# Patient Record
Sex: MALE | Race: WHITE | Employment: FULL TIME | URBAN - METROPOLITAN AREA
[De-identification: names, ages, dates, MRNs, and addresses within clinical notes are randomized per-mention and may not be internally consistent; named-entity substitution may affect disease eponyms.]

---

## 2017-01-02 DIAGNOSIS — I10 HYPERTENSION GOAL BP (BLOOD PRESSURE) < 140/90: Primary | ICD-10-CM

## 2017-01-02 DIAGNOSIS — M10.9 GOUT: Primary | ICD-10-CM

## 2017-01-02 RX ORDER — ALLOPURINOL 300 MG/1
300 TABLET ORAL DAILY
Qty: 30 TABLET | Refills: 0 | Status: CANCELLED | OUTPATIENT
Start: 2017-01-02

## 2017-01-02 RX ORDER — LISINOPRIL 20 MG/1
20 TABLET ORAL DAILY
Qty: 90 TABLET | Refills: 3 | Status: CANCELLED | OUTPATIENT
Start: 2017-01-02

## 2017-01-04 NOTE — TELEPHONE ENCOUNTER
His last appointment(s) at [facility] was 5-1-14. He should be seen for a hypertension visit. He should get the medication from his current provider until he is seen at [facility].

## 2017-01-04 NOTE — TELEPHONE ENCOUNTER
Alvaro Pires MD at 1/4/2017  1:26 PM      Status: Signed         Expand All Collapse All    His last appointment(s) at [facility] was 5-1-14. He should be seen for a hypertension visit. He should get the medication from his current provider until he is seen at [facility].

## 2017-01-27 ENCOUNTER — OFFICE VISIT (OUTPATIENT)
Dept: FAMILY MEDICINE | Facility: CLINIC | Age: 47
End: 2017-01-27
Payer: COMMERCIAL

## 2017-01-27 VITALS
BODY MASS INDEX: 35.16 KG/M2 | WEIGHT: 274 LBS | HEART RATE: 64 BPM | RESPIRATION RATE: 15 BRPM | TEMPERATURE: 98.9 F | SYSTOLIC BLOOD PRESSURE: 148 MMHG | DIASTOLIC BLOOD PRESSURE: 86 MMHG | OXYGEN SATURATION: 99 %

## 2017-01-27 DIAGNOSIS — M10.9 GOUT, UNSPECIFIED CAUSE, UNSPECIFIED CHRONICITY, UNSPECIFIED SITE: ICD-10-CM

## 2017-01-27 DIAGNOSIS — N52.9 ERECTILE DYSFUNCTION, UNSPECIFIED ERECTILE DYSFUNCTION TYPE: ICD-10-CM

## 2017-01-27 DIAGNOSIS — I10 HYPERTENSION GOAL BP (BLOOD PRESSURE) < 140/90: Primary | ICD-10-CM

## 2017-01-27 PROCEDURE — 99213 OFFICE O/P EST LOW 20 MIN: CPT | Performed by: FAMILY MEDICINE

## 2017-01-27 RX ORDER — COLCHICINE 0.6 MG/1
TABLET ORAL
Qty: 30 TABLET | Refills: 0 | Status: SHIPPED
Start: 2017-01-27 | End: 2017-03-30

## 2017-01-27 RX ORDER — LISINOPRIL 20 MG/1
20 TABLET ORAL DAILY
Qty: 90 TABLET | Refills: 3 | Status: SHIPPED | OUTPATIENT
Start: 2017-01-27 | End: 2022-01-04 | Stop reason: ALTCHOICE

## 2017-01-27 RX ORDER — SILDENAFIL 100 MG/1
50-100 TABLET, FILM COATED ORAL DAILY PRN
Qty: 6 TABLET | Refills: 3 | Status: SHIPPED | OUTPATIENT
Start: 2017-01-27

## 2017-01-27 RX ORDER — ALLOPURINOL 300 MG/1
300 TABLET ORAL DAILY
Qty: 90 TABLET | Refills: 3 | Status: SHIPPED | OUTPATIENT
Start: 2017-01-27 | End: 2018-04-14

## 2017-01-27 ASSESSMENT — PAIN SCALES - GENERAL: PAINLEVEL: NO PAIN (0)

## 2017-01-27 NOTE — PATIENT INSTRUCTIONS
Gout Diet  Gout is a painful condition caused by an excess of uric acid, a waste product made by the body. Uric acid forms crystals that collect in the joints. This brings on symptoms of joint pain and swelling. This is called a gout attack. Often, medications and diet changes are combined to manage gout. Below are some guidelines for changing your diet to help you manage gout and prevent attacks. Your health care provider will help you determine the best eating plan for you.     Limiting or avoiding certain foods can help prevent gout attacks.   Eating to manage gout  Weight loss for those who are overweight may help reduce gout attacks.  Eat less of these foods  Eating too many foods containing purines may raise the levels of uric acid in your body. This raises your risk for a gout attack. Try to limit these foods and drinks:    Alcohol, such as beer and red wine. You may be told to avoid alcohol completely.    Soft drinks that contain sugar or high fructose corn syrup    Certain fish, including anchovies, sardines, fish eggs, and herring    Certain meats, such as red meat, hot dogs, luncheon meats, and turkey    Organ meats, such as liver, kidneys, and sweetbreads    Legumes, such as dried beans and peas    Mushrooms, spinach, asparagus, and cauliflower    Other high fat foods such as gravy, whole milk, and high fat cheeses  Eat more of these foods  Other foods may be helpful for people with gout. Add some of these foods to your diet:    Dark berries, such as blueberries, blackberries, and cherries. These contain chemicals that may lower uric acid.    Tofu, a source of protein made from soy. Studies have shown that it may be a better choice than meat for people with gout.    Omega fatty acids. These are found in some fatty fish such as salmon, certain oils (flax, olive, or nut), and nuts themselves. Omega fatty acids may help prevent inflammation due to gout.    Dairy products that are low-fat or fat-free, such  as cheese and yogurt    Complex carbohydrate foods, including whole grains, brown rice, oats, and beans    Coffee, in moderation  Follow-up care  Follow up with your health care provider, or as advised.  When to seek medical advice  Call your health care provider right away if any of these occur:    Return of gout symptoms, usually at night:    Severe pain, swelling, and heat in a joint, especially the base of the big toe    Affected joint is hard to move    Skin of the affected joint is purple or red    Fever of 100.4 F (38 C) or higher    Pain that doesn't get better even with prescribed medicine     2170-0070 [employer]. [address] [place] [address]. All rights reserved. This information is not intended as a substitute for professional medical care. Always follow your healthcare professional's instructions.        Eating to Prevent Gout  Gout is a painful form of arthritis caused by an excess of uric acid. This is a waste product made by the body. It builds up in the body and forms crystals that collect in the joints, bringing on a gout attack. Alcohol and certain foods can trigger a gout attack. Below are some guidelines for changing your diet to help you manage gout. Your healthcare provider can work with you to determine the best eating plan for you. Know that diet is only one part of managing gout. Take your medicines as prescribed and follow the other guidelines your healthcare provider has given you.  Foods to limit  Eating too many foods containing purines may increase the levels of uric acid in your body and increase your risk for a gout attack. It may be best to limit these high-purine foods:    Alcohol (beer, red wine). You may be told to avoid alcohol completely.    Certain fish (anchovies, sardines, fish roes, herring, tuna, mussels, codfish, scallops, trout, and jamarcus)    Certain meats (red meat, processed meat, almendarez, turkey, wild game, and goose)    Sauces and gravies  made with meat    Organ meats (such as liver, kidneys, sweetbreads, and tripe)    Legumes (such as dried beans, peas)    Mushrooms, spinach, asparagus, and cauliflower    Yeast and yeast extract supplements  Foods to try  Some foods may be helpful for people with gout. You may want to try adding some of the following foods to your diet:    Dark berries: These include blueberries, blackberries, and cherries. These berries contain chemicals that may lower uric acid.    Tofu: Tofu, which is made from soy, is a good source of protein. Studies have shown that it may be a better choice than meat for people with gout.    Omega fatty acids: These acids are found in fatty fish (such as salmon), certain oils (such as flax, olive, or nut oils), or nuts. They may help prevent inflammation due to gout.  The following guidelines are recommended by the American Medical Association for people with gout. Your diet should be:    High in fiber, whole grains, fruits, and vegetables.    Low in protein (15% of calories should come from protein. Choose lean sources such as soy, lean meats, and poultry).    Low in fat (no more than 30% of calories should come from fat, with only 10% coming from animal fat).     1752-0890 [employer]. [address] [place] [address], [place] [address]. All rights reserved. This information is not intended as a substitute for professional medical care. Always follow your healthcare professional's instructions.

## 2017-01-27 NOTE — PROGRESS NOTES
SUBJECTIVE:                                                    Rafiq Domínguez is a 46 year old male who presents to clinic today for the following health issues:      Patient needs a refill on Allopurinol and Lisinopril      Amount of exercise or physical activity: works for [employer] , very active    Problems taking medications regularly: Yes,  Ran out    Medication side effects: none    Diet: regular (no restrictions)    --------------------------------------------------------------------------------------------------------------------------------------      SUBJECTIVE:  Rafiq Domínguez is an 46 year old male who presents for a follow up evaluation of his hypertension.The patient was kept on the same medications at the last visit. The patient reports that he IS NOT taking the medication as prescribed as he ran out of it. . He denies side effects of medication.  He last took it 2 weeks ago because he ran out of the medication.    He takes allopurinol daily but he also ran out of it about 2 weeks.   He reports that he is having some toe pain since stopping the allopurinol .        Patient Active Problem List   Diagnosis     Obesity     CARDIOVASCULAR SCREENING; LDL GOAL LESS THAN 160     Hypertension goal BP (blood pressure) < 140/90     Gout, unspecified cause, unspecified chronicity, unspecified site     Erectile dysfunction, unspecified erectile dysfunction type       Is the HYPERTENSION goal on the problem list? Yes    Current Outpatient Prescriptions   Medication     sildenafil (VIAGRA) 100 MG cap/tab     lisinopril (PRINIVIL/ZESTRIL) 20 MG tablet     allopurinol (ZYLOPRIM) 300 MG tablet     colchicine (COLCRYS) 0.6 MG tablet     [DISCONTINUED] lisinopril (PRINIVIL,ZESTRIL) 20 MG tablet     [DISCONTINUED] sildenafil (VIAGRA) 100 MG tablet     No current facility-administered medications for this visit.     Use of agents associated with hypertension: none    Allergies   Allergen Reactions     Nkda [No Known Drug  Allergies]        Social History   Substance Use Topics     Smoking status: Never Smoker      Smokeless tobacco: Not on file     Alcohol Use: Yes      Comment: 2 beers daily       OBJECTIVE:  /86 mmHg  Pulse 64  Temp(Src) 98.9  F (37.2  C) (Tympanic)  Resp 15  Wt 274 lb (124.286 kg)  SpO2 99%    Heart: negative, PMI normal. No lifts, heaves, or thrills. RRR. No murmurs, clicks gallops or rub  Lower Extremities: 0 edema on right and 0 edema on the left        Results for orders placed or performed in visit on 05/01/14   Basic metabolic panel   Result Value Ref Range    Sodium 139 133 - 144 mmol/L    Potassium 4.3 3.4 - 5.3 mmol/L    Chloride 104 94 - 109 mmol/L    Carbon Dioxide 24 20 - 32 mmol/L    Anion Gap 11 6 - 17 mmol/L    Glucose 100 (H) 60 - 99 mg/dL    Urea Nitrogen 15 5 - 24 mg/dL    Creatinine 0.98 0.66 - 1.25 mg/dL    GFR Estimate 84 >60 mL/min/1.7m2    GFR Estimate If Black >90 >60 mL/min/1.7m2    Calcium 9.1 8.5 - 10.4 mg/dL   Uric acid   Result Value Ref Range    Uric Acid 6.7 3.5 - 8.5 mg/dL       The ASCVD Risk score (Jose DC Jr., et al., 2013) failed to calculate for the following reasons:    Cannot find a previous HDL lab    Cannot find a previous total cholesterol lab    ASSESSMENT:  Essential hypertension which is poorly controlled.       Plan:  - Medication: restart  the current doses of medication.    The patient was advised to do the following therapuetic life style changes  - Dietary sodium restriction and increase potassium and Calcium intake  - Regular aerobic exercise  - Weight loss  - Discontinue smoking if applicable  - Avoid regular NSAID use if applicable  - Avoid regular decongestant use if applicable  - Follow up in clinic in 3 weeks for a recheck  - Check a basic metabolic panel today    Patient Education: Reviewed risks of hypertension and principles of   Treatment.    (I10) Hypertension goal BP (blood pressure) < 140/90  (primary encounter diagnosis)  Comment:   Plan:  lisinopril (PRINIVIL/ZESTRIL) 20 MG tablet            (M10.9) Gout, unspecified cause, unspecified chronicity, unspecified site  Comment:   Plan: allopurinol (ZYLOPRIM) 300 MG tablet,         colchicine (COLCRYS) 0.6 MG tablet            (N52.9) Erectile dysfunction, unspecified erectile dysfunction type  Comment:   Plan: sildenafil (VIAGRA) 100 MG cap/tab

## 2017-01-27 NOTE — NURSING NOTE
"Chief Complaint   Patient presents with     Recheck Medication     out of medication x 2 weeks       Initial /87 mmHg  Pulse 64  Temp(Src) 98.9  F (37.2  C) (Tympanic)  Resp 15  Wt 274 lb (124.286 kg)  SpO2 99% Estimated body mass index is 35.16 kg/(m^2) as calculated from the following:    Height as of 5/1/14: 6' 2\" (1.88 m).    Weight as of this encounter: 274 lb (124.286 kg).  BP completed using cuff size: shelbi Gavin MA      "

## 2017-01-27 NOTE — MR AVS SNAPSHOT
After Visit Summary   1/27/2017    Rafiq Domínguez    MRN: [id number]           Patient Information     Date Of Birth          1970        Visit Information        Provider Department      1/27/2017 10:45 AM Alvaro Pires MD [facility]        Today's Diagnoses     Hypertension goal BP (blood pressure) < 140/90    -  1     Gout, unspecified cause, unspecified chronicity, unspecified site         Erectile dysfunction, unspecified erectile dysfunction type           Care Instructions      Gout Diet  Gout is a painful condition caused by an excess of uric acid, a waste product made by the body. Uric acid forms crystals that collect in the joints. This brings on symptoms of joint pain and swelling. This is called a gout attack. Often, medications and diet changes are combined to manage gout. Below are some guidelines for changing your diet to help you manage gout and prevent attacks. Your health care provider will help you determine the best eating plan for you.     Limiting or avoiding certain foods can help prevent gout attacks.   Eating to manage gout  Weight loss for those who are overweight may help reduce gout attacks.  Eat less of these foods  Eating too many foods containing purines may raise the levels of uric acid in your body. This raises your risk for a gout attack. Try to limit these foods and drinks:    Alcohol, such as beer and red wine. You may be told to avoid alcohol completely.    Soft drinks that contain sugar or high fructose corn syrup    Certain fish, including anchovies, sardines, fish eggs, and herring    Certain meats, such as red meat, hot dogs, luncheon meats, and turkey    Organ meats, such as liver, kidneys, and sweetbreads    Legumes, such as dried beans and peas    Mushrooms, spinach, asparagus, and cauliflower    Other high fat foods such as gravy, whole milk, and high fat cheeses  Eat more of these foods  Other foods may be helpful for people with  gout. Add some of these foods to your diet:    Dark berries, such as blueberries, blackberries, and cherries. These contain chemicals that may lower uric acid.    Tofu, a source of protein made from soy. Studies have shown that it may be a better choice than meat for people with gout.    Omega fatty acids. These are found in some fatty fish such as salmon, certain oils (flax, olive, or nut), and nuts themselves. Omega fatty acids may help prevent inflammation due to gout.    Dairy products that are low-fat or fat-free, such as cheese and yogurt    Complex carbohydrate foods, including whole grains, brown rice, oats, and beans    Coffee, in moderation  Follow-up care  Follow up with your health care provider, or as advised.  When to seek medical advice  Call your health care provider right away if any of these occur:    Return of gout symptoms, usually at night:    Severe pain, swelling, and heat in a joint, especially the base of the big toe    Affected joint is hard to move    Skin of the affected joint is purple or red    Fever of 100.4 F (38 C) or higher    Pain that doesn't get better even with prescribed medicine     9252-2753 [employer]. [address] [place] [address]. All rights reserved. This information is not intended as a substitute for professional medical care. Always follow your healthcare professional's instructions.        Eating to Prevent Gout  Gout is a painful form of arthritis caused by an excess of uric acid. This is a waste product made by the body. It builds up in the body and forms crystals that collect in the joints, bringing on a gout attack. Alcohol and certain foods can trigger a gout attack. Below are some guidelines for changing your diet to help you manage gout. Your healthcare provider can work with you to determine the best eating plan for you. Know that diet is only one part of managing gout. Take your medicines as prescribed and follow the other  guidelines your healthcare provider has given you.  Foods to limit  Eating too many foods containing purines may increase the levels of uric acid in your body and increase your risk for a gout attack. It may be best to limit these high-purine foods:    Alcohol (beer, red wine). You may be told to avoid alcohol completely.    Certain fish (anchovies, sardines, fish roes, herring, tuna, mussels, codfish, scallops, trout, and jamarcus)    Certain meats (red meat, processed meat, almendarez, turkey, wild game, and goose)    Sauces and gravies made with meat    Organ meats (such as liver, kidneys, sweetbreads, and tripe)    Legumes (such as dried beans, peas)    Mushrooms, spinach, asparagus, and cauliflower    Yeast and yeast extract supplements  Foods to try  Some foods may be helpful for people with gout. You may want to try adding some of the following foods to your diet:    Dark berries: These include blueberries, blackberries, and cherries. These berries contain chemicals that may lower uric acid.    Tofu: Tofu, which is made from soy, is a good source of protein. Studies have shown that it may be a better choice than meat for people with gout.    Omega fatty acids: These acids are found in fatty fish (such as salmon), certain oils (such as flax, olive, or nut oils), or nuts. They may help prevent inflammation due to gout.  The following guidelines are recommended by the American Medical Association for people with gout. Your diet should be:    High in fiber, whole grains, fruits, and vegetables.    Low in protein (15% of calories should come from protein. Choose lean sources such as soy, lean meats, and poultry).    Low in fat (no more than 30% of calories should come from fat, with only 10% coming from animal fat).     0838-4972 [employer]. [address] [place] [address], [place] [address]. All rights reserved. This information is not intended as a substitute for professional medical care. Always follow your  "healthcare professional's instructions.              Follow-ups after your visit        Follow-up notes from your care team     Return in about 3 weeks (around 2017) for BP Recheck and lab work.      Who to contact     If you have questions or need follow up information about today's clinic visit or your schedule please contact [facility] directly at [phone].  Normal or non-critical lab and imaging results will be communicated to you by MyChart, letter or phone within 4 business days after the clinic has received the results. If you do not hear from us within 7 days, please contact the clinic through MyChart or phone. If you have a critical or abnormal lab result, we will notify you by phone as soon as possible.  Submit refill requests through [employer] or call your pharmacy and they will forward the refill request to us. Please allow 3 business days for your refill to be completed.          Additional Information About Your Visit        [employer] Information     [employer] lets you send messages to your doctor, view your test results, renew your prescriptions, schedule appointments and more. To sign up, go to [email or web address] . Click on \"Log in\" on the left side of the screen, which will take you to the Welcome page. Then click on \"Sign up Now\" on the right side of the page.     You will be asked to enter the access code listed below, as well as some personal information. Please follow the directions to create your username and password.     Your access code is: [email or web address]  Expires: 2017 11:24 AM     Your access code will  in 90 days. If you need help or a new code, please call your [facility] clinic or [phone].        Care EveryWhere ID     This is your Care EveryWhere ID. This could be used by other organizations to access your [facility] medical records  [id number]        Your Vitals Were     Pulse Temperature Respirations Pulse Oximetry          64 98.9  F (37.2  C) " (Tympanic) 15 99%         Blood Pressure from Last 3 Encounters:   01/27/17 148/86   05/01/14 134/84   10/15/12 139/88    Weight from Last 3 Encounters:   01/27/17 274 lb (124.286 kg)   05/01/14 266 lb (120.657 kg)   10/15/12 257 lb (116.574 kg)              Today, you had the following     No orders found for display         Today's Medication Changes          These changes are accurate as of: 1/27/17 11:24 AM.  If you have any questions, ask your nurse or doctor.               Start taking these medicines.        Dose/Directions    colchicine 0.6 MG tablet   Commonly known as:  COLCRYS   Used for:  Gout, unspecified cause, unspecified chronicity, unspecified site   Started by:  Alvaro Pires MD        2 tabs at the onset of flare, then 1 tab every 2 hrs until pain is better or diarrhea occurs, up to 10 doses. Wait 3 days until taking again   Quantity:  30 tablet   Refills:  0         These medicines have changed or have updated prescriptions.        Dose/Directions    allopurinol 300 MG tablet   Commonly known as:  ZYLOPRIM   This may have changed:  additional instructions   Used for:  Gout, unspecified cause, unspecified chronicity, unspecified site   Changed by:  Alvaro Pires MD        Dose:  300 mg   Take 1 tablet (300 mg) by mouth daily   Quantity:  90 tablet   Refills:  3       lisinopril 20 MG tablet   Commonly known as:  PRINIVIL/ZESTRIL   This may have changed:  additional instructions   Used for:  Hypertension goal BP (blood pressure) < 140/90   Changed by:  Alvaro Pires MD        Dose:  20 mg   Take 1 tablet (20 mg) by mouth daily   Quantity:  90 tablet   Refills:  3            Where to get your medicines      These medications were sent to [place] [address] [place] [facility] [place], [place] - [address] [facility]  [address] [facility] [address]     Phone:  [phone]    - allopurinol 300 MG tablet  - colchicine 0.6 MG tablet  - lisinopril 20 MG tablet  - sildenafil 100 MG cap/tab              Primary Care Provider Office Phone # Fax #    Alvaro Pires [phone]       [facility] [address] [facility] [place] [address]        Thank you!     Thank you for choosing [facility]  for your care. Our goal is always to provide you with excellent care. Hearing back from our patients is one way we can continue to improve our services. Please take a few minutes to complete the written survey that you may receive in the mail after your visit with us. Thank you!             Your Updated Medication List - Protect others around you: Learn how to safely use, store and throw away your medicines at www.disposemymeds.org.          This list is accurate as of: 1/27/17 11:24 AM.  Always use your most recent med list.                   Brand Name Dispense Instructions for use    allopurinol 300 MG tablet    ZYLOPRIM    90 tablet    Take 1 tablet (300 mg) by mouth daily       colchicine 0.6 MG tablet    COLCRYS    30 tablet    2 tabs at the onset of flare, then 1 tab every 2 hrs until pain is better or diarrhea occurs, up to 10 doses. Wait 3 days until taking again       lisinopril 20 MG tablet    PRINIVIL/ZESTRIL    90 tablet    Take 1 tablet (20 mg) by mouth daily       sildenafil 100 MG cap/tab    VIAGRA    6 tablet    Take 0.5-1 tablets ( mg) by mouth daily as needed for erectile dysfunction Take 30 min to 4 hours before intercourse.

## 2017-03-30 ENCOUNTER — TELEPHONE (OUTPATIENT)
Dept: FAMILY MEDICINE | Facility: CLINIC | Age: 47
End: 2017-03-30

## 2017-03-30 DIAGNOSIS — M10.9 GOUT, UNSPECIFIED CAUSE, UNSPECIFIED CHRONICITY, UNSPECIFIED SITE: ICD-10-CM

## 2017-03-30 DIAGNOSIS — I10 HYPERTENSION GOAL BP (BLOOD PRESSURE) < 140/90: Primary | ICD-10-CM

## 2017-03-30 RX ORDER — COLCHICINE 0.6 MG/1
TABLET ORAL
Qty: 30 TABLET | Refills: 0 | Status: SHIPPED | OUTPATIENT
Start: 2017-03-30 | End: 2021-01-07

## 2017-03-30 NOTE — TELEPHONE ENCOUNTER
Medication refilled per RN protocol.  Patient is informed he prescription has been sent.  Patient is overdue for labs.  Informed will have lab orders placed and will be contacted to schedule an appointment.  The patient/parent agrees with the plan and verbalized good understanding.    Please place labs.      Per Gout refill protocol patient needs to have Uric Acid labs,   Creatinine, ALT, AST, CBC.     Patient will need to be contacted to schedule appointment.     Chelsy Mitchell RN

## 2017-03-31 DIAGNOSIS — M10.9 GOUT, UNSPECIFIED CAUSE, UNSPECIFIED CHRONICITY, UNSPECIFIED SITE: ICD-10-CM

## 2017-03-31 RX ORDER — COLCHICINE 0.6 MG/1
TABLET ORAL
Qty: 30 TABLET | Refills: 0 | OUTPATIENT
Start: 2017-03-31

## 2017-04-04 ENCOUNTER — TELEPHONE (OUTPATIENT)
Dept: FAMILY MEDICINE | Facility: CLINIC | Age: 47
End: 2017-04-04

## 2017-04-05 NOTE — TELEPHONE ENCOUNTER
Patient left V/M works nights and to call back and leave message. Notified spouse patient needing non-fasting lab appointment. States will give patient the message./Janey Ryan,

## 2017-04-11 NOTE — TELEPHONE ENCOUNTER
I left a message for the pt to return a call to [phone] and ask for someone from Dr. Pires's team.   Majo Jj M.A.

## 2017-09-12 ENCOUNTER — TELEPHONE (OUTPATIENT)
Dept: FAMILY MEDICINE | Facility: CLINIC | Age: 47
End: 2017-09-12

## 2017-09-12 NOTE — TELEPHONE ENCOUNTER
Panel Management Review      Patient has the following on his problem list:     Hypertension   Last three blood pressure readings:  BP Readings from Last 3 Encounters:   01/27/17 148/86   05/01/14 134/84   10/15/12 139/88     Blood pressure: FAILED    HTN Guidelines:  Age 18-59 BP range:  Less than 140/90  Age 60-85 with Diabetes:  Less than 140/90  Age 60-85 without Diabetes:  less than 150/90        Composite cancer screening  Chart review shows that this patient is due/due soon for the following None  Summary:    Patient is due/failing the following:   BP CHECK and LDL    Action needed:   Patient needs office visit for Blood pressure check and fasting lab work.    Type of outreach:    Sent letter.    Questions for provider review:    None                                                                                                                                    Kathi Ibanez cma       Chart routed to Closed letter sent .

## 2017-09-12 NOTE — LETTER
[facility]  [address] [facility] [address]  Phone: [phone]          09/12/17    Rafiq Domínguez  [address] [facility] [address]            Dear Rafiq Domínguez      Our records indicate that you have not scheduled for a(n)Fasting bloodwork and Blood pressure check  which was recommended by your health care team. Monitoring and managing your preventative and chronic health conditions are very important to us.       If you have received your health care elsewhere, please provide us with that information so it can be documented in your chart.    Please call [phone] or message us through your [employer] account to schedule an appointment or provide information for your chart.     We look forward to seeing you and working with you on your health care needs.     Sincerely,   Alvaro Pires MD/ms          *If you have already scheduled an appointment, please disregard this reminder

## 2017-12-21 ENCOUNTER — TELEPHONE (OUTPATIENT)
Dept: FAMILY MEDICINE | Facility: CLINIC | Age: 47
End: 2017-12-21

## 2017-12-21 NOTE — LETTER
[facility]  [address] [facility] [address]  Phone: [phone]            Rafiq Domínguez  [address] [facility] [address]          December 21, 2017          Dear Rafiq Domínguez      Our records indicate that you have not scheduled for a(n)Fasting bloodwork and Blood pressure check  which was recommended by your health care team. Monitoring and managing your preventative and chronic health conditions are very important to us.       If you have received your health care elsewhere, please provide us with that information so it can be documented in your chart.    Please call [phone] or message us through your [employer] account to schedule an appointment or provide information for your chart.     We look forward to seeing you and working with you on your health care needs.     Sincerely,   Alvaro Pires MD/ms          *If you have already scheduled an appointment, please disregard this reminder

## 2017-12-21 NOTE — TELEPHONE ENCOUNTER
Panel Management Review      Patient has the following on his problem list:     Hypertension   Last three blood pressure readings:  BP Readings from Last 3 Encounters:   01/27/17 148/86   05/01/14 134/84   10/15/12 139/88     Blood pressure: FAILED    HTN Guidelines:  Age 18-59 BP range:  Less than 140/90  Age 60-85 with Diabetes:  Less than 140/90  Age 60-85 without Diabetes:  less than 150/90        Composite cancer screening  Chart review shows that this patient is due/due soon for the following None  Summary:    Patient is due/failing the following:   Fasting lab work and BP CHECK    Action needed:   Patient needs office visit for Fasting lab work and BP CHECK.    Type of outreach:    Sent letter.    Questions for provider review:    None                                                                                                                                    Kathi Ibanez [facility]       Chart routed to Closed letter sent .

## 2018-04-14 DIAGNOSIS — M10.9 GOUT, UNSPECIFIED CAUSE, UNSPECIFIED CHRONICITY, UNSPECIFIED SITE: ICD-10-CM

## 2018-04-16 RX ORDER — ALLOPURINOL 300 MG/1
TABLET ORAL
Qty: 90 TABLET | Refills: 3 | Status: SHIPPED | OUTPATIENT
Start: 2018-04-16 | End: 2019-06-25

## 2018-04-16 NOTE — TELEPHONE ENCOUNTER
ALLOPURINOL 300 MG TABLET  Will file in chart as: allopurinol (ZYLOPRIM) 300 MG tablet  TAKE 1 TABLET (300 MG) BY MOUTH DAILY       Disp: 90 tablet Refills: 3    Class: E-Prescribe Start: 4/14/2018   For: Gout, unspecified cause, unspecified chronicity, unspecified site  Originally ordered: 8 years ago by Francisco Mccray Jr., MD  Last refill:1/17/2018  Gout Agents Protocol Failed4/14 1:15 AM   CBC on file in past 12 months    ALT on file in past 12 months    Uric acid greater than or equal to 6 on file in past 12 months    Recent (12 mo) or future (30 days) visit within the authorizing provider's specialty    Normal serum creatinine on file in the past 12 months    Patient is age 18 or older     Last OV Dr. Alvaro Pires: 1/27/17

## 2019-06-25 DIAGNOSIS — M10.9 GOUT, UNSPECIFIED CAUSE, UNSPECIFIED CHRONICITY, UNSPECIFIED SITE: ICD-10-CM

## 2019-06-26 RX ORDER — ALLOPURINOL 300 MG/1
TABLET ORAL
Qty: 90 TABLET | Refills: 4 | Status: SHIPPED | OUTPATIENT
Start: 2019-06-26 | End: 2021-01-07

## 2019-06-26 NOTE — TELEPHONE ENCOUNTER
"Requested Prescriptions   Pending Prescriptions Disp Refills     allopurinol (ZYLOPRIM) 300 MG tablet [Pharmacy Med Name: ALLOPURINOL 300 MG TABLET] 90 tablet 4     Sig: TAKE 1 TABLET BY MOUTH DAILY       Gout Agents Protocol Failed - 6/25/2019  1:19 AM        Failed - CBC on file in past 12 months     No lab results found.              Failed - ALT on file in past 12 months     No lab results found.          Failed - Has Uric Acid on file in past 12 months and value is less than 6     Recent Labs   Lab Test 05/01/14  1215   URIC 6.7     If level is 6mg/dL or greater, ok to refill one time and refer to provider.           Failed - Recent (12 mo) or future (30 days) visit within the authorizing provider's specialty     Patient had office visit in the last 12 months or has a visit in the next 30 days with authorizing provider or within the authorizing provider's specialty.  See \"Patient Info\" tab in inbasket, or \"Choose Columns\" in Meds & Orders section of the refill encounter.              Failed - Normal serum creatinine on file in the past 12 months     Recent Labs   Lab Test 05/01/14  1215   CR 0.98            "

## 2020-09-06 DIAGNOSIS — M10.9 GOUT, UNSPECIFIED CAUSE, UNSPECIFIED CHRONICITY, UNSPECIFIED SITE: ICD-10-CM

## 2020-09-06 NOTE — LETTER
September 10, 2020    Rafiq Domínguez  [address] [facility] [address]    Dear Rafiq,       We recently received a refill request for ALLOPURINOL 300 MG TABLET .  We have not refilled this  because you are due for a:    Complete physical with fasting labs office visit      Please call at your earliest convenience so that there will not be a delay with your future refills.          Thank you,   Your [facility] Team/RB  [phone]

## 2020-09-08 NOTE — TELEPHONE ENCOUNTER
Routing refill request to provider for review/approval because:  Labs not current:  No results found for: ALT  Uric Acid   Date Value Ref Range Status   05/01/2014 6.7 3.5 - 8.5 mg/dL Final     Creatinine   Date Value Ref Range Status   05/01/2014 0.98 0.66 - 1.25 mg/dL Final     No results found for: WBC  No results found for: RBC  No results found for: HGB  No results found for: HCT  No results found for: MCV  No results found for: MCH  No results found for: MCHC  No results found for: RDW  No results found for: PLT    Patient needs to be seen because it has been more than 1 year since last office visit, 1/27/17.    Chelsy Mitchell RN

## 2020-09-08 NOTE — TELEPHONE ENCOUNTER
Please call the patient and make a  face to face appointment(s) for a complete physical exam  and then you can refill the medication one time. If the patient will have enough medication until this appointment(s) then please do not refill it and I can refill it/them at the time of his appointment.  Alvaro Pires MD

## 2020-09-09 RX ORDER — ALLOPURINOL 300 MG/1
TABLET ORAL
Qty: 90 TABLET | Refills: 4 | OUTPATIENT
Start: 2020-09-09

## 2020-09-11 DIAGNOSIS — M10.9 GOUT, UNSPECIFIED CAUSE, UNSPECIFIED CHRONICITY, UNSPECIFIED SITE: ICD-10-CM

## 2020-09-14 RX ORDER — ALLOPURINOL 300 MG/1
TABLET ORAL
Qty: 90 TABLET | Refills: 4 | OUTPATIENT
Start: 2020-09-14

## 2020-09-14 NOTE — TELEPHONE ENCOUNTER
Patient sent letter 9/10/20  No pending appointment  Denial sent to the pharmacy.  Chelsy Mitchell RN

## 2021-01-07 ENCOUNTER — TELEPHONE (OUTPATIENT)
Dept: FAMILY MEDICINE | Facility: CLINIC | Age: 51
End: 2021-01-07

## 2021-01-07 DIAGNOSIS — M10.9 GOUT, UNSPECIFIED CAUSE, UNSPECIFIED CHRONICITY, UNSPECIFIED SITE: ICD-10-CM

## 2021-01-07 RX ORDER — COLCHICINE 0.6 MG/1
TABLET ORAL
Qty: 30 TABLET | Refills: 0 | Status: SHIPPED | OUTPATIENT
Start: 2021-01-07 | End: 2021-12-11

## 2021-01-07 RX ORDER — ALLOPURINOL 300 MG/1
1 TABLET ORAL DAILY
Qty: 90 TABLET | Refills: 0 | Status: SHIPPED | OUTPATIENT
Start: 2021-01-07 | End: 2021-04-01

## 2021-01-07 NOTE — TELEPHONE ENCOUNTER
Patient is calling he is have a gout flare up on his foot.  Requesting allopurinol (ZYLOPRIM) 300 MG tablet and colchicine (COLCRYS) 0.6 MG tablet.ALEXANDRA advised he needs to make appointments for pre-visit fasting labs and a complete physical. Patient is going to speak with his supervisor and see when he can take time off from [employer] to be seen for these appointments. He is in uma and can hardly walk. Please call to advise if these medications can be filled.  ALEXANDRA Pham

## 2021-04-01 DIAGNOSIS — M10.9 GOUT, UNSPECIFIED CAUSE, UNSPECIFIED CHRONICITY, UNSPECIFIED SITE: ICD-10-CM

## 2021-04-01 RX ORDER — ALLOPURINOL 300 MG/1
TABLET ORAL
Qty: 90 TABLET | Refills: 0 | Status: SHIPPED | OUTPATIENT
Start: 2021-04-01 | End: 2021-12-12

## 2021-04-01 NOTE — TELEPHONE ENCOUNTER
"Requested Prescriptions   Pending Prescriptions Disp Refills    allopurinol (ZYLOPRIM) 300 MG tablet [Pharmacy Med Name: ALLOPURINOL 300 MG TABLET] 90 tablet 0     Sig: TAKE 1 TABLET BY MOUTH EVERY DAY       Gout Agents Protocol Failed - 4/1/2021 12:09 AM        Failed - CBC on file in past 12 months     No lab results found.              Failed - ALT on file in past 12 months     No lab results found.          Failed - Has Uric Acid on file in past 12 months and value is less than 6     Recent Labs   Lab Test 05/01/14  1215   URIC 6.7     If level is 6mg/dL or greater, ok to refill one time and refer to provider.           Failed - Recent (12 mo) or future (30 days) visit within the authorizing provider's specialty     Patient has had an office visit with the authorizing provider or a provider within the authorizing providers department within the previous 12 mos or has a future within next 30 days. See \"Patient Info\" tab in inbasket, or \"Choose Columns\" in Meds & Orders section of the refill encounter.              Failed - Normal serum creatinine on file in the past 12 months     Recent Labs   Lab Test 05/01/14  1215   CR 0.98       Ok to refill medication if creatinine is low          Passed - Medication is active on med list        Passed - Patient is age 18 or older             "

## 2021-11-27 ENCOUNTER — NURSE TRIAGE (OUTPATIENT)
Dept: NURSING | Facility: CLINIC | Age: 51
End: 2021-11-27
Payer: COMMERCIAL

## 2021-11-28 NOTE — TELEPHONE ENCOUNTER
and wife called.  Tai and his son are not vaccinated.  Both have minor covid symptoms.  23 year old son was positive a few days ago per a home test.  Tai just took a home test and is now positive.  Wife and daughter live in the home to.  They are vaccinated.  They want to know what they should do?    Gave advise to get wife and daughter tested.  Sent to scheduling to set up a virtual visit.    Advise given.  Consider wife and daughter positve until otherwise.  Family will follow advise.    COVID 19 Nurse Triage Plan/Patient Instructions    Please be aware that novel coronavirus (COVID-19) may be circulating in the community. If you develop symptoms such as fever, cough, or SOB or if you have concerns about the presence of another infection including coronavirus (COVID-19), please contact your health care provider or visit [email or web address].     Disposition/Instructions    Virtual Visit with provider recommended. Reference Visit Selection Guide.    Thank you for taking steps to prevent the spread of this virus.  o Limit your contact with others.  o Wear a simple mask to cover your cough.  o Wash your hands well and often.    Resources    [facility]: About COVID-19: [email or web address]/    CDC: What to Do If You're Sick: www.cdc.gov/coronavirus/2019-ncov/about/steps-when-sick.html    CDC: Ending Home Isolation: www.cdc.gov/coronavirus/2019-ncov/hcp/disposition-in-home-patients.html     CDC: Caring for Someone: www.cdc.gov/coronavirus/2019-ncov/if-you-are-sick/care-for-someone.html     [facility]: Interim Guidance for Hospital Discharge to Home: www.health.[facility].[place].us/diseases/coronavirus/hcp/hospdischarge.pdf    [facility] clinical trials (COVID-19 research studies): clinicalaffairs.[facility]/umn-clinical-trials     Below are the COVID-19 hotlines at the [place] Department of Health ([facility]). Interpreters are available.   o For health questions: Call 497-568-0085 or  1-162.321.3861 (7 a.m. to 7 p.m.)  o For questions about schools and childcare: Call 204-005-5547 or 1-385.242.8783 (7 a.m. to 7 p.m.)         Coretta Short RN   11/27/21 9:47 PM  [facility] Nurse Advisor          Reason for Disposition    [1] COVID-19 exposure AND [2] no symptoms    [1] CLOSE CONTACT COVID-19 EXPOSURE within last 14 days AND [2] NO symptoms    Additional Information    Negative: SEVERE difficulty breathing (e.g., struggling for each breath, speaks in single words)    Negative: Difficult to awaken or acting confused (e.g., disoriented, slurred speech)    Negative: Bluish (or gray) lips or face now    Negative: Shock suspected (e.g., cold/pale/clammy skin, too weak to stand, low BP, rapid pulse)    Negative: Sounds like a life-threatening emergency to the triager    Protocols used: CORONAVIRUS (COVID-19) DIAGNOSED OR [id number] 8.25.2021, CORONAVIRUS (COVID-19) EXPOSURE-[facility] 8.25.2021

## 2021-12-11 ENCOUNTER — NURSE TRIAGE (OUTPATIENT)
Dept: NURSING | Facility: CLINIC | Age: 51
End: 2021-12-11
Payer: COMMERCIAL

## 2021-12-11 DIAGNOSIS — M10.9 GOUT, UNSPECIFIED CAUSE, UNSPECIFIED CHRONICITY, UNSPECIFIED SITE: ICD-10-CM

## 2021-12-11 NOTE — TELEPHONE ENCOUNTER
"Positive for coronavirus. Has gout and can't walk. Needs something to treat that. He knows he's over due for an appointment so I connected him with scheduling to set that up. I explained they can't do a refill unless he meets the protocol. Please call him on Monday about whether they will prescribe now that he's signed up for an appointment:  [phone].      Reason for Disposition    [1] Caller requesting a NON-URGENT new prescription or refill AND [2] triager unable to refill per unit policy    Additional Information    Negative: Drug overdose and triager unable to answer question    Negative: Caller requesting information unrelated to medicine    Negative: Caller requesting a prescription for Strep throat and has a positive culture result    Negative: Rash while taking a medication or within 3 days of stopping it    Negative: Immunization reaction suspected    Negative: [1] Asthma and [2] having symptoms of asthma (cough, wheezing, etc.)    Negative: [1] Influenza symptoms AND [2] anti-viral med prescription request, such as Tamiflu    Negative: [1] Symptom of illness (e.g., headache, abdominal pain, earache, vomiting) AND [2] more than mild    Negative: MORE THAN A DOUBLE DOSE of a prescription or over-the-counter (OTC) drug    Negative: [1] DOUBLE DOSE (an extra dose or lesser amount) of over-the-counter (OTC) drug AND [2] any symptoms (e.g., dizziness, nausea, pain, sleepiness)    Negative: [1] DOUBLE DOSE (an extra dose or lesser amount) of prescription drug AND [2] any symptoms (e.g., dizziness, nausea, pain, sleepiness)    Negative: Took another person's prescription drug    Negative: [1] DOUBLE DOSE (an extra dose or lesser amount) of prescription drug AND [2] NO symptoms (Exception: a double dose of antibiotics)    Negative: Diabetes drug error or overdose (e.g., took wrong type of insulin or took extra dose)    Negative: [1] Request for URGENT new prescription or refill of \"essential\" medication (i.e., " likelihood of harm to patient if not taken) AND [2] triager unable to fill per unit policy    Negative: [1] Prescription not at pharmacy AND [2] was prescribed by PCP recently    Negative: [1] Pharmacy calling with prescription questions AND [2] triager unable to answer question    Negative: [1] Caller has URGENT medication question about med that PCP or specialist prescribed AND [2] triager unable to answer question    Negative: [1] Caller has NON-URGENT medication question about med that PCP prescribed AND [2] triager unable to answer question    Protocols used: MEDICATION QUESTION CALL-A-AH

## 2021-12-12 ENCOUNTER — OFFICE VISIT (OUTPATIENT)
Dept: URGENT CARE | Facility: URGENT CARE | Age: 51
End: 2021-12-12
Payer: COMMERCIAL

## 2021-12-12 VITALS
DIASTOLIC BLOOD PRESSURE: 126 MMHG | OXYGEN SATURATION: 99 % | TEMPERATURE: 97.7 F | SYSTOLIC BLOOD PRESSURE: 168 MMHG | HEART RATE: 95 BPM

## 2021-12-12 DIAGNOSIS — M10.9 GOUT, UNSPECIFIED CAUSE, UNSPECIFIED CHRONICITY, UNSPECIFIED SITE: ICD-10-CM

## 2021-12-12 PROCEDURE — 99214 OFFICE O/P EST MOD 30 MIN: CPT | Performed by: NURSE PRACTITIONER

## 2021-12-12 RX ORDER — LISINOPRIL 20 MG/1
20 TABLET ORAL DAILY
Qty: 30 TABLET | Refills: 0 | Status: SHIPPED | OUTPATIENT
Start: 2021-12-12 | End: 2022-01-20

## 2021-12-12 RX ORDER — ALLOPURINOL 300 MG/1
1 TABLET ORAL DAILY
Qty: 90 TABLET | Refills: 0 | Status: SHIPPED | OUTPATIENT
Start: 2021-12-12 | End: 2022-01-20

## 2021-12-12 RX ORDER — COLCHICINE 0.6 MG/1
TABLET ORAL
Qty: 10 TABLET | Refills: 0 | Status: SHIPPED | OUTPATIENT
Start: 2021-12-12 | End: 2021-12-20

## 2021-12-12 ASSESSMENT — ENCOUNTER SYMPTOMS
GASTROINTESTINAL NEGATIVE: 1
CARDIOVASCULAR NEGATIVE: 1
RESPIRATORY NEGATIVE: 1
NEUROLOGICAL NEGATIVE: 1

## 2021-12-12 NOTE — PATIENT INSTRUCTIONS
Patient Education     Treating Gout Attacks     Raising the joint above the level of your heart can help reduce gout symptoms.     Gout is a disease that affects the joints. It is caused by excess uric acid in your blood that may lead to crystals forming in your joints. Left untreated, it can lead to painful foot and joint deformities and even kidney problems. But, by treating gout early, you can relieve pain and help prevent future problems. Gout can usually be treated with medicine and proper diet. In severe cases, surgery may be needed.  Gout attacks are painful and often happen more than once. Taking medicines may reduce pain and prevent attacks in the future. There are also some things you can do at home to relieve symptoms.  Medicines for gout  Your healthcare provider may prescribe a daily medicine to reduce levels of uric acid. Reducing your uric acid levels may help prevent gout attacks. Allopurinol is one commonly used medicine taken daily to reduce uric acid levels. Other daily medicines used to reduce uric acid levels include febuxostat, lesinurad, and probencid. Other medicines can help relieve pain and swelling during an acute attack. Medicines such as NSAIDs (nonsteroidal anti-inflammatory medicines), steroids, and colchicine may be prescribed for intermittent use to relieve an acute gout attack. Be sure to take your medicine as directed.  What you can do  Below are some things you can do at home to relieve gout symptoms. Your healthcare provider may have other tips.    Rest the painful joint as much as you can.    Raise the painful joint so it is at a level higher than your heart.    Use ice for 10 minutes every 1 to 2 hours as possible.  How can I prevent gout?  With a little effort, you may be able to prevent gout attacks in the future. Here are some things you can do:    Don't eat foods high in purines  ? Certain meats (red meat, processed meat, turkey)  ? Organ meats (kidney, liver,  sweetbread)  ? Shellfish (lobster, crab, shrimp, scallop, mussel)  ? Certain fish (anchovy, sardine, herring, mackerel)    Take any medicines prescribed by your healthcare provider.    Lose weight if you need to.    Reduce high fructose corn syrup in meals and drinks.    Reduce or cut out alcohol, particularly beer, but also red wine and spirits.    Control blood pressure, diabetes, and cholesterol.    Drink plenty of water to help flush uric acid from your body.  [employer] last reviewed this educational content on 4/1/2018 2000-2021 [employer]. All rights reserved. This information is not intended as a substitute for professional medical care. Always follow your healthcare professional's instructions.           Patient Education     Gout Diet  Gout is a painful condition caused by an excess of uric acid, a waste product made by the body. Uric acid forms crystals that collect in the joints. The immune response to these crystals brings on symptoms of joint pain and swelling. This is called a gout attack. Often, medications and diet changes are combined to manage gout. Below are some guidelines for changing your diet to help you manage gout and prevent attacks. Your healthcare provider will help you determine the best eating plan for you.  Eating to manage gout  Weight loss for those who are overweight may help reduce gout attacks.  Eat less of these foods  Eating too many foods containing purines may raise the levels of uric acid in your body. This raises your risk for a gout attack. Try to limit these foods and drinks:    Alcohol, such as beer and red wine. You may be told to avoid alcohol completely.    Soft drinks that contain sugar or high fructose corn syrup    Certain fish, including anchovies, sardines, fish eggs, and herring    Shellfish    Certain meats, such as red meat, hot dogs, luncheon meats, and turkey    Organ meats, such as liver, kidneys, and sweetbreads    Legumes, such as dried  beans and peas    Other high fat foods such as gravy, whole milk, and high fat cheeses    Vegetables such as asparagus, cauliflower, spinach, and mushrooms used to be thought to contribute to an increased risk for a gout attack, but recent studies show that high purine vegetables don't increase the risk for a gout attack.  Eat more of these foods  Other foods may be helpful for people with gout. Add some of these foods to your diet:    Cherries contain chemicals that may lower uric acid.    Omega fatty acids. These are found in some fatty fish such as salmon, certain oils (flax, olive, or nut), and nuts themselves. Omega fatty acids may help prevent inflammation due to gout.    Dairy products that are low-fat or fat-free, such as cheese and yogurt    Complex carbohydrate foods, including whole grains, brown rice, oats, and beans    Coffee, in moderation    Water, approximately 64 ounces per day  Follow-up care  Follow up with your healthcare provider as advised.  When to seek medical advice  Call your healthcare provider right away if any of these occur:    Return of gout symptoms, usually at night:    Severe pain, swelling, and heat in a joint, especially the base of the big toe    Affected joint is hard to move    Skin of the affected joint is purple or red    Fever of 100.4 F (38 C) or higher    Pain that doesn't get better even with prescribed medicine   Katie last reviewed this educational content on 6/1/2018 2000-2021 [employer]. All rights reserved. This information is not intended as a substitute for professional medical care. Always follow your healthcare professional's instructions.

## 2021-12-12 NOTE — LETTER
[facility] [place]  [address] [facility] [address]  Phone: [phone]    December 12, 2021        Rafiq Domínguez  [address] [facility] [address]          To whom it may concern:    RE: Rafiq Domínguez    Patient was seen and treated today at our clinic.  He has been advised to stay off his feet for the next two days.   Please excuse him from work until Wednesday 12/15 unless notified   By another healthcare provider.     Please contact me for questions or concerns.      Sincerely,        EDGARD Strickland CNP

## 2021-12-12 NOTE — PROGRESS NOTES
HPI  Rafiq Domínguez 51 year old presents with CHIEF COMPLAINT of left foot pain.  He reports that he is having a gout flare and that it started approximately 48 hours ago.  He states this is the worst one that he is ever had and that he is in excruciating pain.  It is noted that he is hypertensive and he states that he has not been on his antihypertensives for several weeks.  These will be refilled at this visit.  He also endorses that he is recently recovering from Covid infection approximately 2 weeks ago, though does not have any lingering symptoms at this time.     Review of Systems   Respiratory: Negative.         Recent COVID-19   Cardiovascular: Negative.    Gastrointestinal: Negative.    Musculoskeletal:        Left foot    Skin:        Left food reddened and inflamed looking   Neurological: Negative.    Endo/Heme/Allergies:        Hx Gout         Physical Exam  Vitals and nursing note reviewed.   Constitutional:       Comments: BP (!) 168/126   Pulse 95   Temp 97.7  F (36.5  C) (Tympanic)   SpO2 99%. Appears very uncomfortable      HENT:      Head: Normocephalic.   Cardiovascular:      Rate and Rhythm: Normal rate.   Pulmonary:      Effort: Pulmonary effort is normal.   Musculoskeletal:      Left foot: Tenderness present.      Comments: Dorsum of the left foot is exquisitely tender over the second metatarsal joint on the left foot.  The great toe on the left foot is also mildly edematous and tender   Skin:     Findings: Erythema present.      Comments: Dorsum of left foot is mildly erythematous, warm to touch, and no evidence of breaks in physical integrity to suggest cellulitis.   Neurological:      Mental Status: He is alert and oriented to person, place, and time.       Assessment:  1. Gout, unspecified cause, unspecified chronicity, unspecified site        Plan:  Orders Placed This Encounter     lisinopril (ZESTRIL) 20 MG tablet     colchicine (COLCYRS) 0.6 MG tablet     allopurinol (ZYLOPRIM) 300  MG tablet   Restart BP meds today, follow up with PCP in next week for recheck  Instructions regarding self-care of patient/child reviewed.   Written instructions provided in after visit summary and reviewed.  Patient instructed to see primary care provider for new or persistent symptoms.   Red flag symptoms reviewed and patient has been instructed to seek emergent care  Please contact pharmacy for medication questions.  Patient instructed to take medications as directed on package.    Continue other medications as previously prescribed.    Caty Mehta, DNP, APRN, CNP

## 2021-12-13 RX ORDER — COLCHICINE 0.6 MG/1
TABLET ORAL
Qty: 30 TABLET | Refills: 0 | Status: SHIPPED | OUTPATIENT
Start: 2021-12-13 | End: 2022-01-04

## 2021-12-13 NOTE — TELEPHONE ENCOUNTER
"Requested Prescriptions   Pending Prescriptions Disp Refills     colchicine (COLCRYS) 0.6 MG tablet 30 tablet 0     Si tabs at the onset of flare, then 1 tab every 2 hrs until pain is better or diarrhea occurs, up to 10 doses. Wait 3 days until taking again       Gout Agents Protocol Failed - 2021  2:28 PM        Failed - CBC on file in past 12 months     No lab results found.              Failed - ALT on file in past 12 months     No lab results found.          Failed - Has Uric Acid on file in past 12 months and value is less than 6     Recent Labs   Lab Test 14  1215   URIC 6.7     If level is 6mg/dL or greater, ok to refill one time and refer to provider.           Failed - Recent (12 mo) or future (30 days) visit within the authorizing provider's specialty     Patient has had an office visit with the authorizing provider or a provider within the authorizing providers department within the previous 12 mos or has a future within next 30 days. See \"Patient Info\" tab in inbasket, or \"Choose Columns\" in Meds & Orders section of the refill encounter.              Failed - Normal serum creatinine on file in the past 12 months     Recent Labs   Lab Test 14  1215   CR 0.98       Ok to refill medication if creatinine is low          Passed - Medication is active on med list        Passed - Patient is age 18 or older           Marisela LLANES, RN    "

## 2021-12-20 DIAGNOSIS — M10.9 GOUT, UNSPECIFIED CAUSE, UNSPECIFIED CHRONICITY, UNSPECIFIED SITE: ICD-10-CM

## 2021-12-20 RX ORDER — COLCHICINE 0.6 MG/1
TABLET ORAL
Qty: 10 TABLET | Refills: 0 | Status: SHIPPED | OUTPATIENT
Start: 2021-12-20 | End: 2022-01-04 | Stop reason: ALTCHOICE

## 2021-12-20 NOTE — TELEPHONE ENCOUNTER
"Requested Prescriptions   Pending Prescriptions Disp Refills     colchicine (COLCYRS) 0.6 MG tablet 10 tablet 0     Si tabs now, repeat with one tab every 2 hours until diarrhea develops then 1 tab daily.       Gout Agents Protocol Failed - 2021  1:19 PM        Failed - CBC on file in past 12 months     No lab results found.              Failed - ALT on file in past 12 months     No lab results found.          Failed - Has Uric Acid on file in past 12 months and value is less than 6     Recent Labs   Lab Test 14  1215   URIC 6.7     If level is 6mg/dL or greater, ok to refill one time and refer to provider.           Failed - Recent (12 mo) or future (30 days) visit within the authorizing provider's specialty     Patient has had an office visit with the authorizing provider or a provider within the authorizing providers department within the previous 12 mos or has a future within next 30 days. See \"Patient Info\" tab in inbasket, or \"Choose Columns\" in Meds & Orders section of the refill encounter.              Failed - Normal serum creatinine on file in the past 12 months     Recent Labs   Lab Test 14  1215   CR 0.98       Ok to refill medication if creatinine is low          Passed - Medication is active on med list        Passed - Patient is age 18 or older             Next 5 appointments (look out 90 days)    2022  1:20 PM  (Arrive by 1:00 PM)  Adult Preventative Visit with Alvaro Pires MD  [facility] ([facility] ) [address] [facility] [address] [phone]          Marisela MÉNDEZN, RN    "

## 2021-12-20 NOTE — TELEPHONE ENCOUNTER
Rafiq came to the , thought he had an appointment for today but his appointment isn't until 1/20/22 with Dr Pires. He understands that he needs to have a physical before he is prescribed his medications, which he has an appointment for but since he thought his appointment was for today he is in a bind with one of his medications. He is fine with waiting for his other medications to be refilled until he sees Dr Pires until 1/20/22. He is looking to hopefully get the Colchicine .6mg refilled. You can call him and let him know on his cell phone or he gave permission to call his wife, Yaz [phone]

## 2022-01-04 ENCOUNTER — OFFICE VISIT (OUTPATIENT)
Dept: FAMILY MEDICINE | Facility: CLINIC | Age: 52
End: 2022-01-04
Payer: COMMERCIAL

## 2022-01-04 VITALS
TEMPERATURE: 98.1 F | OXYGEN SATURATION: 97 % | SYSTOLIC BLOOD PRESSURE: 181 MMHG | HEART RATE: 106 BPM | HEIGHT: 74 IN | BODY MASS INDEX: 35.29 KG/M2 | DIASTOLIC BLOOD PRESSURE: 141 MMHG | WEIGHT: 275 LBS

## 2022-01-04 DIAGNOSIS — Z23 NEED FOR PROPHYLACTIC VACCINATION AND INOCULATION AGAINST INFLUENZA: ICD-10-CM

## 2022-01-04 DIAGNOSIS — Z23 HIGH PRIORITY FOR 2019-NCOV VACCINE: ICD-10-CM

## 2022-01-04 DIAGNOSIS — M10.9 GOUT, UNSPECIFIED CAUSE, UNSPECIFIED CHRONICITY, UNSPECIFIED SITE: Primary | ICD-10-CM

## 2022-01-04 DIAGNOSIS — I10 HYPERTENSION GOAL BP (BLOOD PRESSURE) < 140/90: ICD-10-CM

## 2022-01-04 LAB
ANION GAP SERPL CALCULATED.3IONS-SCNC: 5 MMOL/L (ref 3–14)
BUN SERPL-MCNC: 12 MG/DL (ref 7–30)
CALCIUM SERPL-MCNC: 9.3 MG/DL (ref 8.5–10.1)
CHLORIDE BLD-SCNC: 103 MMOL/L (ref 94–109)
CO2 SERPL-SCNC: 28 MMOL/L (ref 20–32)
CREAT SERPL-MCNC: 0.9 MG/DL (ref 0.66–1.25)
GFR SERPL CREATININE-BSD FRML MDRD: >90 ML/MIN/1.73M2
GLUCOSE BLD-MCNC: 137 MG/DL (ref 70–99)
POTASSIUM BLD-SCNC: 4.9 MMOL/L (ref 3.4–5.3)
SODIUM SERPL-SCNC: 136 MMOL/L (ref 133–144)
URATE SERPL-MCNC: 5.8 MG/DL (ref 3.5–7.2)

## 2022-01-04 PROCEDURE — 99214 OFFICE O/P EST MOD 30 MIN: CPT | Mod: 25 | Performed by: FAMILY MEDICINE

## 2022-01-04 PROCEDURE — 90471 IMMUNIZATION ADMIN: CPT | Performed by: FAMILY MEDICINE

## 2022-01-04 PROCEDURE — 80048 BASIC METABOLIC PNL TOTAL CA: CPT | Performed by: FAMILY MEDICINE

## 2022-01-04 PROCEDURE — 0011A COVID-19,PF,MODERNA (18+ YRS PRIMARY SERIES .5ML): CPT | Performed by: FAMILY MEDICINE

## 2022-01-04 PROCEDURE — 90682 RIV4 VACC RECOMBINANT DNA IM: CPT | Performed by: FAMILY MEDICINE

## 2022-01-04 PROCEDURE — 84550 ASSAY OF BLOOD/URIC ACID: CPT | Performed by: FAMILY MEDICINE

## 2022-01-04 PROCEDURE — 36415 COLL VENOUS BLD VENIPUNCTURE: CPT | Performed by: FAMILY MEDICINE

## 2022-01-04 PROCEDURE — 91301 COVID-19,PF,MODERNA (18+ YRS PRIMARY SERIES .5ML): CPT | Performed by: FAMILY MEDICINE

## 2022-01-04 RX ORDER — COLCHICINE 0.6 MG/1
TABLET ORAL
Qty: 30 TABLET | Refills: 3 | Status: SHIPPED | OUTPATIENT
Start: 2022-01-04 | End: 2022-01-20

## 2022-01-04 RX ORDER — LISINOPRIL 40 MG/1
40 TABLET ORAL DAILY
Qty: 30 TABLET | Refills: 0 | Status: SHIPPED | OUTPATIENT
Start: 2022-01-04 | End: 2022-01-20

## 2022-01-04 ASSESSMENT — MIFFLIN-ST. JEOR: SCORE: 2177.14

## 2022-01-04 ASSESSMENT — PAIN SCALES - GENERAL: PAINLEVEL: WORST PAIN (10)

## 2022-01-04 NOTE — PROGRESS NOTES
"  Assessment & Plan     (M10.9) Gout, unspecified cause, unspecified chronicity, unspecified site  (primary encounter diagnosis)  Comment: flare due to interrupted meds  Plan: colchicine (COLCRYS) 0.6 MG tablet, Basic         metabolic panel  (Ca, Cl, CO2, Creat, Gluc, K,         Na, BUN), Uric acid        Refill colchicine, check labs today    (I10) Hypertension goal BP (blood pressure) < 140/90  Comment: not to goal on 20 mg   Plan: lisinopril (ZESTRIL) 40 MG tablet        Increase to 40 mg has follow-up scheduled with pcp    (Z23) Need for prophylactic vaccination and inoculation against influenza  Comment: due  Plan: INFLUENZA QUAD, RECOMBINANT, P-FREE (RIV4)         (FLUBLOK)            (Z23) High priority for 2019-nCoV vaccine  Comment: due  Plan: COVID-19,PF,MODERNA (18+ Yrs Primary Series         .5mL)                        BMI:   Estimated body mass index is 35.31 kg/m  as calculated from the following:    Height as of this encounter: 1.88 m (6' 2\").    Weight as of this encounter: 124.7 kg (275 lb).   Weight management plan: Discussed healthy diet and exercise guidelines    Patient Instructions     Increase lisinopril to 40 mg daily.      Your choices are as follows:  1. Colonoscopy - A colonoscopy is a procedure that is performed to see the inside of the rectum by using a long, thin, and flexible tube with a tiny video camera and light at the end. This test is done every 10 years if it is normal.  2. Cologuard stool card -  may be completed at home. Cologuard uses a DNA marker in your stool to detect colon cancer and some precancerous polyps. It is mailed to your home, completed and mailed back. This test is to be completed every three years.  3. FIT stool card -  may be picked up at our lab,completed at home then returned to our lab. This test is to be completed every year.      Check on coverage for Shingrix (shingles shot)        Return in about 16 days (around 1/20/2022) for BP Recheck.    Maria De Jesus QUAN" "MD Mahendra  [facility]   Rafiq is a 51 year old who presents for the following health issues     HPI   Pt here for gout attack in right knee, usually has in foot.  Had attack in 12/2021, resolved with colchicine and restarted allopurinol.    Had been on allopurinol previously, did not have flares so felt he didn't need it.  Also stopped lisinopril.      All meds restarted after URGENT CARE visit    Understands why flares are happening while starting allopurinol again.  Denies ha, vision change, cp, shortness of breath with elevated bp - also in significant pain.            Review of Systems   Constitutional, HEENT, cardiovascular, pulmonary, gi and gu systems are negative, except as otherwise noted.      Objective    BP (!) 181/141   Pulse 106   Temp 98.1  F (36.7  C)   Ht 1.88 m (6' 2\")   Wt 124.7 kg (275 lb)   SpO2 97%   BMI 35.31 kg/m    Body mass index is 35.31 kg/m .  Physical Exam   GENERAL: healthy, alert and obvious mild/mod distress due to pain  EYES: Eyes grossly normal to inspection, PERRL and conjunctivae and sclerae normal  MS: no gross musculoskeletal defects noted, right knee edematous and tender with all movement  SKIN: no suspicious lesions or rashes  PSYCH: mentation appears normal, affect normal/bright                "

## 2022-01-04 NOTE — LETTER
January 4, 2022      Rafiq Domínguez  [address] [facility] [address]        To Whom It May Concern:    Rafiq Domínguez  was seen on 01/04/22.  Please excuse him until 1/6/2022 due to injury.        Sincerely,        Maria De Jesus Mccray MD

## 2022-01-04 NOTE — PATIENT INSTRUCTIONS
Increase lisinopril to 40 mg daily.      Your choices are as follows:  1. Colonoscopy - A colonoscopy is a procedure that is performed to see the inside of the rectum by using a long, thin, and flexible tube with a tiny video camera and light at the end. This test is done every 10 years if it is normal.  2. Cologuard stool card -  may be completed at home. Cologuard uses a DNA marker in your stool to detect colon cancer and some precancerous polyps. It is mailed to your home, completed and mailed back. This test is to be completed every three years.  3. FIT stool card -  may be picked up at our lab,completed at home then returned to our lab. This test is to be completed every year.      Check on coverage for Shingrix (shingles shot)

## 2022-01-20 ENCOUNTER — OFFICE VISIT (OUTPATIENT)
Dept: FAMILY MEDICINE | Facility: CLINIC | Age: 52
End: 2022-01-20
Payer: COMMERCIAL

## 2022-01-20 VITALS
SYSTOLIC BLOOD PRESSURE: 160 MMHG | WEIGHT: 266 LBS | BODY MASS INDEX: 34.14 KG/M2 | RESPIRATION RATE: 18 BRPM | HEART RATE: 92 BPM | DIASTOLIC BLOOD PRESSURE: 110 MMHG | TEMPERATURE: 97.4 F | HEIGHT: 74 IN | OXYGEN SATURATION: 98 %

## 2022-01-20 DIAGNOSIS — E66.812 CLASS 2 OBESITY WITH BODY MASS INDEX (BMI) OF 35.0 TO 35.9 IN ADULT, UNSPECIFIED OBESITY TYPE, UNSPECIFIED WHETHER SERIOUS COMORBIDITY PRESENT: ICD-10-CM

## 2022-01-20 DIAGNOSIS — M10.9 GOUT, UNSPECIFIED CAUSE, UNSPECIFIED CHRONICITY, UNSPECIFIED SITE: ICD-10-CM

## 2022-01-20 DIAGNOSIS — Z13.220 SCREENING FOR HYPERLIPIDEMIA: ICD-10-CM

## 2022-01-20 DIAGNOSIS — L30.9 ECZEMA, UNSPECIFIED TYPE: ICD-10-CM

## 2022-01-20 DIAGNOSIS — Z12.11 SCREEN FOR COLON CANCER: ICD-10-CM

## 2022-01-20 DIAGNOSIS — I10 HYPERTENSION GOAL BP (BLOOD PRESSURE) < 140/90: ICD-10-CM

## 2022-01-20 DIAGNOSIS — Z29.9 PREVENTIVE MEASURE: ICD-10-CM

## 2022-01-20 DIAGNOSIS — Z00.00 ROUTINE GENERAL MEDICAL EXAMINATION AT A HEALTH CARE FACILITY: Primary | ICD-10-CM

## 2022-01-20 DIAGNOSIS — Z11.59 ENCOUNTER FOR HEPATITIS C SCREENING TEST FOR LOW RISK PATIENT: ICD-10-CM

## 2022-01-20 LAB
ALBUMIN SERPL-MCNC: 4 G/DL (ref 3.4–5)
ALP SERPL-CCNC: 82 U/L (ref 40–150)
ALT SERPL W P-5'-P-CCNC: 50 U/L (ref 0–70)
ANION GAP SERPL CALCULATED.3IONS-SCNC: 4 MMOL/L (ref 3–14)
AST SERPL W P-5'-P-CCNC: 45 U/L (ref 0–45)
BILIRUB SERPL-MCNC: 1.3 MG/DL (ref 0.2–1.3)
BUN SERPL-MCNC: 11 MG/DL (ref 7–30)
CALCIUM SERPL-MCNC: 9.1 MG/DL (ref 8.5–10.1)
CHLORIDE BLD-SCNC: 107 MMOL/L (ref 94–109)
CHOLEST SERPL-MCNC: 157 MG/DL
CO2 SERPL-SCNC: 27 MMOL/L (ref 20–32)
CREAT SERPL-MCNC: 1.08 MG/DL (ref 0.66–1.25)
FASTING STATUS PATIENT QL REPORTED: NO
GFR SERPL CREATININE-BSD FRML MDRD: 84 ML/MIN/1.73M2
GLUCOSE BLD-MCNC: 112 MG/DL (ref 70–99)
HDLC SERPL-MCNC: 54 MG/DL
LDLC SERPL CALC-MCNC: 56 MG/DL
NONHDLC SERPL-MCNC: 103 MG/DL
POTASSIUM BLD-SCNC: 4.5 MMOL/L (ref 3.4–5.3)
PROT SERPL-MCNC: 7.8 G/DL (ref 6.8–8.8)
SODIUM SERPL-SCNC: 138 MMOL/L (ref 133–144)
TRIGL SERPL-MCNC: 236 MG/DL
URATE SERPL-MCNC: 6.7 MG/DL (ref 3.5–7.2)

## 2022-01-20 PROCEDURE — 84550 ASSAY OF BLOOD/URIC ACID: CPT | Performed by: FAMILY MEDICINE

## 2022-01-20 PROCEDURE — 80053 COMPREHEN METABOLIC PANEL: CPT | Performed by: FAMILY MEDICINE

## 2022-01-20 PROCEDURE — 36415 COLL VENOUS BLD VENIPUNCTURE: CPT | Performed by: FAMILY MEDICINE

## 2022-01-20 PROCEDURE — 99396 PREV VISIT EST AGE 40-64: CPT | Performed by: FAMILY MEDICINE

## 2022-01-20 PROCEDURE — 80061 LIPID PANEL: CPT | Performed by: FAMILY MEDICINE

## 2022-01-20 PROCEDURE — 86803 HEPATITIS C AB TEST: CPT | Performed by: FAMILY MEDICINE

## 2022-01-20 RX ORDER — COLCHICINE 0.6 MG/1
TABLET ORAL
Qty: 30 TABLET | Refills: 3 | Status: SHIPPED | OUTPATIENT
Start: 2022-01-20

## 2022-01-20 RX ORDER — DILTIAZEM HYDROCHLORIDE 180 MG/1
180 CAPSULE, EXTENDED RELEASE ORAL DAILY
Qty: 30 CAPSULE | Refills: 1 | Status: SHIPPED | OUTPATIENT
Start: 2022-01-20 | End: 2022-02-21

## 2022-01-20 RX ORDER — ALLOPURINOL 300 MG/1
1 TABLET ORAL DAILY
Qty: 90 TABLET | Refills: 3 | Status: SHIPPED | OUTPATIENT
Start: 2022-01-20 | End: 2023-01-23

## 2022-01-20 RX ORDER — DILTIAZEM HYDROCHLORIDE 120 MG/1
120 TABLET, FILM COATED ORAL 4 TIMES DAILY
Status: CANCELLED | OUTPATIENT
Start: 2022-01-20

## 2022-01-20 RX ORDER — TRIAMCINOLONE ACETONIDE 1 MG/G
CREAM TOPICAL 2 TIMES DAILY
Qty: 100 G | Refills: 1 | Status: SHIPPED | OUTPATIENT
Start: 2022-01-20 | End: 2023-01-23

## 2022-01-20 RX ORDER — LISINOPRIL 40 MG/1
40 TABLET ORAL DAILY
Qty: 90 TABLET | Refills: 3 | Status: SHIPPED | OUTPATIENT
Start: 2022-01-20 | End: 2023-01-23

## 2022-01-20 ASSESSMENT — ENCOUNTER SYMPTOMS
ARTHRALGIAS: 1
CHILLS: 0
MYALGIAS: 0
DIZZINESS: 0
CONSTIPATION: 0
DIARRHEA: 0
JOINT SWELLING: 1
HEARTBURN: 0
EYE PAIN: 0
COUGH: 0
NAUSEA: 0
SORE THROAT: 0
FREQUENCY: 0
NERVOUS/ANXIOUS: 0
PALPITATIONS: 0
PARESTHESIAS: 0
HEADACHES: 0
HEMATOCHEZIA: 0
ABDOMINAL PAIN: 0
SHORTNESS OF BREATH: 0
HEMATURIA: 0
DYSURIA: 0
WEAKNESS: 0
FEVER: 0

## 2022-01-20 ASSESSMENT — MIFFLIN-ST. JEOR: SCORE: 2136.32

## 2022-01-20 ASSESSMENT — PAIN SCALES - GENERAL: PAINLEVEL: NO PAIN (0)

## 2022-01-20 NOTE — PATIENT INSTRUCTIONS
Preventive Health Recommendations  Male Ages 50 - 64    Yearly exam:             See your health care provider every year in order to  o   Review health changes.   o   Discuss preventive care.    o   Review your medicines if your doctor has prescribed any.     Have a cholesterol test every 5 years, or more frequently if you are at risk for high cholesterol/heart disease.     Have a diabetes test (fasting glucose) every three years. If you are at risk for diabetes, you should have this test more often.     Have a colonoscopy at age 50, or have a yearly FIT test (stool test). These exams will check for colon cancer.      Talk with your health care provider about whether or not a prostate cancer screening test (PSA) is right for you.    You should be tested each year for STDs (sexually transmitted diseases), if you re at risk.     Shots: Get a flu shot each year. Get a tetanus shot every 10 years.     Nutrition:    Eat at least 5 servings of fruits and vegetables daily.     Eat whole-grain bread, whole-wheat pasta and brown rice instead of white grains and rice.     Get adequate Calcium and Vitamin D.     Lifestyle    Exercise for at least 150 minutes a week (30 minutes a day, 5 days a week). This will help you control your weight and prevent disease.     Limit alcohol to one drink per day.     No smoking.     Wear sunscreen to prevent skin cancer.     See your dentist every six months for an exam and cleaning.     See your eye doctor every 1 to 2 years.      Please call your medical insurance company and inquire about your benefits for the Shingrix (shingles vaccination). If this is affordable we will give you the vaccination the next time you are seen in clinic or you can make a shot nurse appointment to get this done        Please direct patients to use the website to schedule ALL vaccine and BP check appointments - and do not send patients to the pharmacy unless they are on the  schedule.    [email or web address]

## 2022-01-20 NOTE — PROGRESS NOTES
SUBJECTIVE:   CC: Rafiq Domínguez is an 51 year old male who presents for preventative health visit.       Patient has been advised of split billing requirements and indicates understanding: Yes  Healthy Habits:     Getting at least 3 servings of Calcium per day:  Yes    Bi-annual eye exam:  NO    Dental care twice a year:  NO    Sleep apnea or symptoms of sleep apnea:  None    Diet:  Regular (no restrictions)    Frequency of exercise:  2-3 days/week    Duration of exercise:  15-30 minutes    Taking medications regularly:  Yes    Medication side effects:  None    PHQ-2 Total Score: 0    Additional concerns today:  No              Today's PHQ-2 Score:   PHQ-2 ( 1999 Pfizer) 1/27/2017   Q1: Little interest or pleasure in doing things 0   Q2: Feeling down, depressed or hopeless 0   PHQ-2 Score 0       Abuse: Current or Past(Physical, Sexual or Emotional)- No  Do you feel safe in your environment? Yes    Have you ever done Advance Care Planning? (For example, a Health Directive, POLST, or a discussion with a medical provider or your loved ones about your wishes): No, advance care planning information given to patient to review.  Advanced care planning was discussed at today's visit.    Social History     Tobacco Use     Smoking status: Never Smoker     Smokeless tobacco: Never Used   Substance Use Topics     Alcohol use: Yes     Comment: 2 beers daily         No flowsheet data found.    Last PSA: No results found for: PSA    Reviewed orders with patient. Reviewed health maintenance and updated orders accordingly -       Reviewed and updated as needed this visit by clinical staff                Reviewed and updated as needed this visit by Provider                   Review of Systems   Constitutional: Negative for chills and fever.   HENT: Negative for congestion, ear pain, hearing loss and sore throat.    Eyes: Negative for pain and visual disturbance.   Respiratory: Negative for cough and shortness of breath.   "  Cardiovascular: Negative for chest pain, palpitations and peripheral edema.   Gastrointestinal: Negative for abdominal pain, constipation, diarrhea, heartburn, hematochezia and nausea.   Genitourinary: Negative for dysuria, frequency, genital sores, hematuria, impotence and urgency.   Musculoskeletal: Positive for arthralgias and joint swelling. Negative for myalgias.   Skin: Positive for rash.   Neurological: Negative for dizziness, weakness, headaches and paresthesias.   Psychiatric/Behavioral: Negative for mood changes. The patient is not nervous/anxious.          OBJECTIVE:   BP (!) 160/110   Pulse 92   Temp 97.4  F (36.3  C) (Tympanic)   Resp 18   Ht 1.88 m (6' 2\")   Wt 120.7 kg (266 lb)   SpO2 98%   BMI 34.15 kg/m      Physical Exam          ASSESSMENT/PLAN:       ICD-10-CM    1. Routine general medical examination at a health care facility  Z00.00    2. Screen for colon cancer  Z12.11 Adult Gastro Ref - Procedure Only   3. Screening for hyperlipidemia  Z13.220    4. Hypertension goal BP (blood pressure) < 140/90  I10 lisinopril (ZESTRIL) 40 MG tablet     Comprehensive metabolic panel (BMP + Alb, Alk Phos, ALT, AST, Total. Bili, TP)     diltiazem ER (DILT-XR) 180 MG 24 hr capsule     Comprehensive metabolic panel (BMP + Alb, Alk Phos, ALT, AST, Total. Bili, TP)   5. Gout, unspecified cause, unspecified chronicity, unspecified site  M10.9 colchicine (COLCRYS) 0.6 MG tablet     allopurinol (ZYLOPRIM) 300 MG tablet     Uric acid     Comprehensive metabolic panel (BMP + Alb, Alk Phos, ALT, AST, Total. Bili, TP)     Comprehensive metabolic panel (BMP + Alb, Alk Phos, ALT, AST, Total. Bili, TP)     Uric acid   6. Eczema, unspecified type  L30.9 triamcinolone (KENALOG) 0.1 % external cream   7. Preventive measure  Z29.9 aspirin (ASA) 81 MG EC tablet   8. Class 2 obesity with body mass index (BMI) of 35.0 to 35.9 in adult, unspecified obesity type, unspecified whether serious comorbidity present  E66.9 Lipid " "panel reflex to direct LDL Fasting    Z68.35 Comprehensive metabolic panel (BMP + Alb, Alk Phos, ALT, AST, Total. Bili, TP)     Comprehensive metabolic panel (BMP + Alb, Alk Phos, ALT, AST, Total. Bili, TP)     Lipid panel reflex to direct LDL Fasting   9. Encounter for hepatitis C screening test for low risk patient  Z11.59 Hepatitis C Screen Reflex to HCV RNA Quant and Genotype     Hepatitis C Screen Reflex to HCV RNA Quant and Genotype       Patient has been advised of split billing requirements and indicates understanding: Yes    COUNSELING:   Reviewed preventive health counseling, as reflected in patient instructions       Regular exercise       Healthy diet/nutrition       Vision screening       Immunizations    Patient advised to schedule through the pharmacy            Aspirin prophylaxis        Alcohol Use        Family planning       Consider Hep C screening for all patients one time for ages 18-79 years       HIV screeninx in teen years, 1x in adult years, and at intervals if high risk       Colon cancer screening       Prostate cancer screening       Osteoporosis prevention/bone health    Estimated body mass index is 35.31 kg/m  as calculated from the following:    Height as of 22: 1.88 m (6' 2\").    Weight as of 22: 124.7 kg (275 lb).     Weight management plan: Discussed healthy diet and exercise guidelines    He reports that he has never smoked. He has never used smokeless tobacco.      Counseling Resources:  ATP IV Guidelines  Pooled Cohorts Equation Calculator  FRAX Risk Assessment  ICSI Preventive Guidelines  Dietary Guidelines for Americans,   [employer]'s MyPlate  ASA Prophylaxis  Lung CA Screening    Alvaro Pires MD  [facility]  --------------------------------------------------------------------------------------------------------------------------------------  SUBJECTIVE:  Rafiq Domínguez is a 51 year old male who presents to the clinic today for a routine " physical exam.    The patient's last physical was 10 years ago.     No results found for: CHOL  No results found for: HDL  No results found for: LDL  No results found for: TRIG  No results found for: CHOLHDLRATIO  The patient's last fasting lipid panel was done 10 years ago and the results are unknown to the patient.        The ASCVD Risk score (Jose DENNIS Jr., et al., 2013) failed to calculate for the following reasons:    Cannot find a previous HDL lab    Cannot find a previous total cholesterol lab      Risk Enhancers:  Family history of premature ASCVD- Yes: his father at age 35  LDL >159- No  Chronic kidney disease- No  Metabolic Syndrome- No  Premature menopause- No  Inflammatory conditions (RA, psoriasis, HIV)- No  SE  Ancestry- Yes  Triglycerides >174- Unknown      The patient reports that he has been treated for high blood pressure.    The patient reports that he does not take a daily aspirin.    No results found for: HCVAB  The patient reports that he has not been screened for Hepatitis C    (Screen all baby boomers once per CDC-- the generation born from 1945 through 1965 and per USPTF screen age 19 to 79 especially younger people who have used IV drugs)  He would like to have an Hepatitis C test today    No results found for: HIAGAB  The patient reports that he has not been screened for HIV   (Screen all 15 to 64 years old)  He would not like to have an HIV test today      Immunization History   Administered Date(s) Administered     COVID-19,PF,Moderna 01/04/2022     Influenza Quad, Recombinant, pf(RIV4) (Flublok) 01/04/2022     TD (ADULT, 7+) 01/01/2003     TDAP Vaccine (Adacel) 05/01/2014     The patient's believes that his last tetanus shot was given 7 year(s) ago.   The patient believes that he has not had a Shingrix in the past  The patient believes that he has not had a PPSV23 in the past.  The patient believes that he has not had a PCV13 in the past.  The patient believes that he has had a  seasonal flu vaccination this fall or winter.  The patient would like to have a Shingrix and COVID vaccinations today      No results found for this or any previous visit.]   The patient denies a family history of colon cancer.  The patient reports that he has not had a colonoscopy.     The patient reports that he does performs a self testicular exam monthly.  His currently used contraception is the oral contraceptive pill . He is not interested in a vasectomy in the near future.  The patient reports a family history of diabetes in his mother .  The patient denies a family history of prostate cancer. After discussing the pros and cons of checking a PSA he  Does not want to have this test drawn today.   The patient reports that he eats or drinks 3 servings of dairy products per day. He does not take a calcium supplement at all.  The patient reports that he has not had a dental appointments for a few years but has one scheduled.  The patient reports that he  has an eye examination approximately every 2 year(s).    Do you currently smoke? No  How many years have you smoked? 0   How many packs per day did you smoke on average? N/A  (if more than 30 pack year history and the patient is age 55-80 consider ordering an annual low dose radiation lung CT to screen for cancer)  (Do not order if patient has quit more than 15 years ago or has a health condition that limits life expectancy or could not tolerate curative lung surgery)  Are you interested having a lung CT to screen for lung cancer? N/A    If the patient has smoked more that 100 cigarettes, has the patient had an imaging study (US or CT) for an AAA between the ages of 65 and 75? N/A            Patient Active Problem List   Diagnosis     Obesity     CARDIOVASCULAR SCREENING; LDL GOAL LESS THAN 160     Hypertension goal BP (blood pressure) < 140/90     Gout, unspecified cause, unspecified chronicity, unspecified site     Erectile dysfunction, unspecified erectile  "dysfunction type       History reviewed. No pertinent surgical history.    History reviewed. No pertinent family history.    Social History     Socioeconomic History     Marital status:      Spouse name: Not on file     Number of children: Not on file     Years of education: Not on file     Highest education level: Not on file   Occupational History     Not on file   Tobacco Use     Smoking status: Never Smoker     Smokeless tobacco: Never Used   Vaping Use     Vaping Use: Never used   Substance and Sexual Activity     Alcohol use: Yes     Comment: 2 beers daily     Drug use: No     Sexual activity: Yes     Partners: Female   Other Topics Concern     Parent/sibling w/ CABG, MI or angioplasty before 65F 55M? No   Social History Narrative     Not on file     Social Determinants of Health     Financial Resource Strain: Not on file   Food Insecurity: Not on file   Transportation Needs: Not on file   Physical Activity: Not on file   Stress: Not on file   Social Connections: Not on file   Intimate Partner Violence: Not on file   Housing Stability: Not on file       Current Outpatient Medications   Medication Sig Dispense Refill     allopurinol (ZYLOPRIM) 300 MG tablet Take 1 tablet (300 mg) by mouth daily 90 tablet 0     colchicine (COLCRYS) 0.6 MG tablet 2 tabs at the onset of flare, then 1 tab every 2 hrs until pain is better or diarrhea occurs, up to 10 doses. Wait 3 days until taking again 30 tablet 3     lisinopril (ZESTRIL) 40 MG tablet Take 1 tablet (40 mg) by mouth daily 30 tablet 0     sildenafil (VIAGRA) 100 MG cap/tab Take 0.5-1 tablets ( mg) by mouth daily as needed for erectile dysfunction Take 30 min to 4 hours before intercourse. 6 tablet 3       PHYSICAL EXAMINATION:  Blood pressure (!) 160/110, pulse 92, temperature 97.4  F (36.3  C), temperature source Tympanic, resp. rate 18, height 1.88 m (6' 2\"), weight 120.7 kg (266 lb), SpO2 98 %.  General appearance - healthy, alert and no " distress  Skin - Skin color, texture, turgor normal. No rashes or lesions.  Head - Normocephalic. No masses, lesions, tenderness or abnormalities  Eyes - conjunctivae/corneas clear. PERRL, EOM's intact. Fundi benign  Ears - External ears normal. Canals clear. TM's normal.  Nose/Sinuses - Nares normal. Septum midline. Mucosa normal. No drainage or sinus tenderness.  Oropharynx - Lips, mucosa, and tongue normal. Teeth and gums normal.  Neck - Neck supple. No adenopathy. Thyroid symmetric, normal size,  Lungs - Percussion normal. Good diaphragmatic excursion. Lungs clear  Heart - PMI normal. No lifts, heaves, or thrills. RRR. No murmurs, clicks gallops or rub  Abdomen - Abdomen soft, non-tender. BS normal. No masses, organomegaly  Extremities - Extremities normal. No deformities, edema, or skin discoloration.  Musculoskeletal - Spine ROM normal. Muscular strength intact.  Peripheral pulses - radial=4/4, femoral=4/4, popliteal=4/4, dorsalis pedis=4/4,  Neuro - Gait normal. Reflexes normal and symmetric. Sensation grossly WNL.  Genitalia - Penis normal. No urethral discharge. Scrotum normal to palpation. No hernia.  Rectal - the patient declined      Office Visit on 01/04/2022   Component Date Value Ref Range Status     Sodium 01/04/2022 136  133 - 144 mmol/L Final     Potassium 01/04/2022 4.9  3.4 - 5.3 mmol/L Final     Chloride 01/04/2022 103  94 - 109 mmol/L Final     Carbon Dioxide (CO2) 01/04/2022 28  20 - 32 mmol/L Final     Anion Gap 01/04/2022 5  3 - 14 mmol/L Final     Urea Nitrogen 01/04/2022 12  7 - 30 mg/dL Final     Creatinine 01/04/2022 0.90  0.66 - 1.25 mg/dL Final     Calcium 01/04/2022 9.3  8.5 - 10.1 mg/dL Final     Glucose 01/04/2022 137* 70 - 99 mg/dL Final     GFR Estimate 01/04/2022 >90  >60 mL/min/1.73m2 Final    Effective December 21, 2021 eGFRcr in adults is calculated using the 2021 CKD-EPI creatinine equation which includes age and gender ( et al., NEJM, DOI: 10.1056/FIKQlt3937640)      Uric Acid 01/04/2022 5.8  3.5 - 7.2 mg/dL Final       ASSESSMENT:    ICD-10-CM    1. Routine general medical examination at a health care facility  Z00.00    2. Screen for colon cancer  Z12.11    3. Screening for hyperlipidemia  Z13.220    4. Hypertension goal BP (blood pressure) < 140/90  I10    5. Gout, unspecified cause, unspecified chronicity, unspecified site  M10.9        Well-Adult Physical Exam.  Health Maintenance Due   Topic Date Due     ANNUAL REVIEW OF HM ORDERS  Never done     COLORECTAL CANCER SCREENING  Never done     HIV SCREENING  Never done     HEPATITIS C SCREENING  Never done     LIPID  Never done     ZOSTER IMMUNIZATION (1 of 2) 08/28/2021     Health Maintenance   Topic Date Due     ANNUAL REVIEW OF HM ORDERS  Never done     COLORECTAL CANCER SCREENING  Never done     HIV SCREENING  Never done     HEPATITIS C SCREENING  Never done     LIPID  Never done     ZOSTER IMMUNIZATION (1 of 2) 08/28/2021     COVID-19 Vaccine (2 - Moderna 3-dose series) 02/01/2022     PREVENTIVE CARE VISIT  01/20/2023     DTAP/TDAP/TD IMMUNIZATION (3 - Td or Tdap) 05/01/2024     ADVANCE CARE PLANNING  01/20/2027     PHQ-2  Completed     INFLUENZA VACCINE  Completed     Pneumococcal Vaccine: Pediatrics (0 to 5 Years) and At-Risk Patients (6 to 64 Years)  Aged Out     IPV IMMUNIZATION  Aged Out     MENINGITIS IMMUNIZATION  Aged Out     HEPATITIS B IMMUNIZATION  Aged Out         HEALTH CARE MAINTENENCE: The recommended screening tests and vaccinatons for this patient have been discussed as above.  The appropriate tests and vaccinations  have been ordered or declined by the patient. Please see the orders in EPIC.The patient specifically declines: rectal exam    Immunization Status:  up to date and documented except for SHINGRIX and COVID     Patient Active Problem List   Diagnosis     Obesity     CARDIOVASCULAR SCREENING; LDL GOAL LESS THAN 160     Hypertension goal BP (blood pressure) < 140/90     Gout, unspecified cause,  unspecified chronicity, unspecified site     Erectile dysfunction, unspecified erectile dysfunction type        ATP III Guidelines  ICSI Preventive Guidelines    PLAN:   Check a fasting lipid profile  I recommended to take a daily aspirin (81 to 325 mg)  Hepatitis C antibody ordered  HIV testing was discussed but the pt declined  Shingrix recommended  COVID #2 vaccine recommended  Colonoscopy recommended  Check a fasting glucose  Checking a PSA was discussed but the pt declined  Discussed calcium intake, vitamins and supplements. Recommended 1000 mg of calcium daily  Weight loss through diet and exercise was recommended  Sunscreen use was recommended especially in the area of tatoos  Refills on chronic medication given  Recommended dental exams every 6 months  Recommended eye exam every 1-2 years  Follow up in 1 year for the next preventative medical visit        Body mass index is 34.15 kg/m .        (I10) Hypertension goal BP (blood pressure) < 140/90  Comment:   Plan: add diltiazem    Follow up in 4 weeks                                                 (M10.9) Gout, unspecified cause, unspecified chronicity, unspecified site  Comment: off of allopurinol      Plan: restart allopurinol    Colchicine as needed

## 2022-01-20 NOTE — LETTER
January 24, 2022      Rafiq Domínguez  [id number] [facility] [address]        Dear Rafiq,   Your cholesterol results are back and are abnormal. The lab values are included in this letter.     Your LDL (bad) cholesterol was normal.   The maximum acceptable level for you based on multiple risk factors including your family history, age, smoking status, and the presence of other diseases such as hypertension, diabetes, or heart disease is less than 70.     Your Triglycerides were too high.   Triglycerides are a measure of fat in your blood and high levels can put you at risk. The maximum acceptable value is 150.     Your HDL (good) cholesterol was too low.   A low HDL can put you at risk. The lowest acceptable level is generally considered to be 40 and some authorities recommend it be 50 or higher for women.     Therapeutic life style changes are usually recommended as the first treatment to help improve your cholesterol. My recommendations at this time include:     -Eating a lower fat and lower cholesterol diet.   -Eating less sweets with simple sugars like candy or baked goods.   -Regular aerobic exercise such as walking, running, biking, roller blading or swimming.    We should recheck this next year at your physical     Sincerely,   Alvaro Pires MD       Resulted Orders   Comprehensive metabolic panel (BMP + Alb, Alk Phos, ALT, AST, Total. Bili, TP)   Result Value Ref Range    Sodium 138 133 - 144 mmol/L    Potassium 4.5 3.4 - 5.3 mmol/L    Chloride 107 94 - 109 mmol/L    Carbon Dioxide (CO2) 27 20 - 32 mmol/L    Anion Gap 4 3 - 14 mmol/L    Urea Nitrogen 11 7 - 30 mg/dL    Creatinine 1.08 0.66 - 1.25 mg/dL    Calcium 9.1 8.5 - 10.1 mg/dL    Glucose 112 (H) 70 - 99 mg/dL    Alkaline Phosphatase 82 40 - 150 U/L    AST 45 0 - 45 U/L    ALT 50 0 - 70 U/L    Protein Total 7.8 6.8 - 8.8 g/dL    Albumin 4.0 3.4 - 5.0 g/dL    Bilirubin Total 1.3 0.2 - 1.3 mg/dL    GFR Estimate 84 >60 mL/min/1.73m2      Comment:       Effective December 21, 2021 eGFRcr in adults is calculated using the 2021 CKD-EPI creatinine equation which includes age and gender (Russell et al., NEJM, DOI: 10.1056/LURRwr7924388)   Lipid panel reflex to direct LDL Fasting   Result Value Ref Range    Cholesterol 157 <200 mg/dL    Triglycerides 236 (H) <150 mg/dL    Direct Measure HDL 54 >=40 mg/dL    LDL Cholesterol Calculated 56 <=100 mg/dL    Non HDL Cholesterol 103 <130 mg/dL    Patient Fasting > 8hrs? No     Narrative    Cholesterol  Desirable:  <200 mg/dL    Triglycerides  Normal:  Less than 150 mg/dL  Borderline High:  150-199 mg/dL  High:  200-499 mg/dL  Very High:  Greater than or equal to 500 mg/dL    Direct Measure HDL  Female:  Greater than or equal to 50 mg/dL   Male:  Greater than or equal to 40 mg/dL    LDL Cholesterol  Desirable:  <100mg/dL  Above Desirable:  100-129 mg/dL   Borderline High:  130-159 mg/dL   High:  160-189 mg/dL   Very High:  >= 190 mg/dL    Non HDL Cholesterol  Desirable:  130 mg/dL  Above Desirable:  130-159 mg/dL  Borderline High:  160-189 mg/dL  High:  190-219 mg/dL  Very High:  Greater than or equal to 220 mg/dL   Uric acid   Result Value Ref Range    Uric Acid 6.7 3.5 - 7.2 mg/dL   Hepatitis C Screen Reflex to HCV RNA Quant and Genotype   Result Value Ref Range    Hepatitis C Antibody Nonreactive Nonreactive    Narrative    Assay performance characteristics have not been established for newborns, infants, and children.

## 2022-01-20 NOTE — NURSING NOTE
"Chief Complaint   Patient presents with     Physical       Initial BP (!) 175/135   Pulse 92   Temp 97.4  F (36.3  C) (Tympanic)   Resp 18   Ht 1.88 m (6' 2\")   Wt 120.7 kg (266 lb)   SpO2 98%   BMI 34.15 kg/m   Estimated body mass index is 34.15 kg/m  as calculated from the following:    Height as of this encounter: 1.88 m (6' 2\").    Weight as of this encounter: 120.7 kg (266 lb).  Medication Reconciliation: complete  Kathi Ibanez CMA  "

## 2022-01-21 LAB — HCV AB SERPL QL IA: NONREACTIVE

## 2022-01-22 NOTE — RESULT ENCOUNTER NOTE
Rafiq,  I have reviewed the results of the laboratory tests that we recently ordered.     The results of your glucose (blood sugar) test was slightly high and in the range of what is called Impaired Glucose Tolerance or Prediabetes. This means that you are at a higher risk to develop type 2 diabetes in the future. Regular aerobic exercise and weight loss are the best ways to prevent yourself from having full blown diabetes. We should recheck this lab test every year at your physical.    Your chance of having a heart attack in the next 10 years based on the factors listed has been determined to be as written below:    The 10-year ASCVD risk score (Jose DENNIS Jr., et al., 2013) is: 3.9%    Values used to calculate the score:      Age: 51 years      Sex: Male      Is Non- : No      Diabetic: No      Tobacco smoker: No      Systolic Blood Pressure: 160 mmHg      Is BP treated: Yes      HDL Cholesterol: 54 mg/dL      Total Cholesterol: 157 mg/dL        Dear Rafiq,  Your cholesterol results are back and are abnormal. The lab values are included in this letter.    Your LDL (bad) cholesterol was normal.  The maximum acceptable level for you based on multiple risk factors including your family history, age, smoking status, and the presence of other diseases such as hypertension, diabetes, or heart disease is less than 70.    Your Triglycerides were too high.  Triglycerides are a measure of fat in your blood and high levels can put you at risk. The maximum acceptable value is 150.    Your HDL (good) cholesterol was too low.  A low HDL can put you at risk. The lowest acceptable level is generally considered to be 40 and some authorities recommend it be 50 or higher for women.    Therapeutic life style changes are usually recommended as the first treatment to help improve your cholesterol. My recommendations at this time include:    -Eating a lower fat and lower cholesterol diet.  -Eating less sweets with  simple sugars like candy or baked goods.  -Regular aerobic exercise such as walking, running, biking, roller blading or swimming.   We should recheck this next year at your physical     Sincerely,  Alvaro Pires MD

## 2022-02-21 ENCOUNTER — OFFICE VISIT (OUTPATIENT)
Dept: FAMILY MEDICINE | Facility: CLINIC | Age: 52
End: 2022-02-21
Payer: COMMERCIAL

## 2022-02-21 VITALS
SYSTOLIC BLOOD PRESSURE: 162 MMHG | RESPIRATION RATE: 18 BRPM | OXYGEN SATURATION: 98 % | DIASTOLIC BLOOD PRESSURE: 98 MMHG | TEMPERATURE: 98.6 F | WEIGHT: 276 LBS | HEART RATE: 79 BPM | BODY MASS INDEX: 35.44 KG/M2

## 2022-02-21 DIAGNOSIS — I10 HYPERTENSION GOAL BP (BLOOD PRESSURE) < 140/90: ICD-10-CM

## 2022-02-21 DIAGNOSIS — Z12.11 SCREEN FOR COLON CANCER: ICD-10-CM

## 2022-02-21 DIAGNOSIS — E66.01 MORBID OBESITY (H): ICD-10-CM

## 2022-02-21 DIAGNOSIS — Z23 HIGH PRIORITY FOR 2019-NCOV VACCINE: Primary | ICD-10-CM

## 2022-02-21 DIAGNOSIS — Z11.4 SCREENING FOR HIV (HUMAN IMMUNODEFICIENCY VIRUS): ICD-10-CM

## 2022-02-21 PROCEDURE — 91301 COVID-19,PF,MODERNA (18+ YRS PRIMARY SERIES .5ML): CPT | Performed by: FAMILY MEDICINE

## 2022-02-21 PROCEDURE — 0012A COVID-19,PF,MODERNA (18+ YRS PRIMARY SERIES .5ML): CPT | Performed by: FAMILY MEDICINE

## 2022-02-21 PROCEDURE — 99214 OFFICE O/P EST MOD 30 MIN: CPT | Mod: 25 | Performed by: FAMILY MEDICINE

## 2022-02-21 RX ORDER — DILTIAZEM HYDROCHLORIDE 300 MG/1
CAPSULE, EXTENDED RELEASE ORAL
Qty: 90 CAPSULE | OUTPATIENT
Start: 2022-02-21

## 2022-02-21 RX ORDER — DILTIAZEM HYDROCHLORIDE 300 MG/1
300 CAPSULE, EXTENDED RELEASE ORAL DAILY
Qty: 30 CAPSULE | Refills: 1 | Status: SHIPPED | OUTPATIENT
Start: 2022-02-21 | End: 2022-04-19

## 2022-02-21 ASSESSMENT — PAIN SCALES - GENERAL: PAINLEVEL: NO PAIN (0)

## 2022-02-21 NOTE — PROGRESS NOTES
SUBJECTIVE:  Rafiq Domínguez is an 51 year old male who presents for a follow up evaluation of his hypertension.The patient was started on 180 mg of diltiazem  at the last visit. The patient reports that he IS taking the medication as prescribed. He denies side effects of medication.      Patient Active Problem List   Diagnosis     Obesity     CARDIOVASCULAR SCREENING; LDL GOAL LESS THAN 160     Hypertension goal BP (blood pressure) < 140/90     Gout, unspecified cause, unspecified chronicity, unspecified site     Erectile dysfunction, unspecified erectile dysfunction type       Is the HYPERTENSION goal on the problem list? Yes      Use of agents associated with hypertension: none  Current Outpatient Medications   Medication     allopurinol (ZYLOPRIM) 300 MG tablet     aspirin (ASA) 81 MG EC tablet     colchicine (COLCRYS) 0.6 MG tablet     diltiazem ER (DILT-XR) 180 MG 24 hr capsule     lisinopril (ZESTRIL) 40 MG tablet     sildenafil (VIAGRA) 100 MG cap/tab     triamcinolone (KENALOG) 0.1 % external cream     No current facility-administered medications for this visit.         Allergies   Allergen Reactions     Nkda [No Known Drug Allergies]        Social History     Tobacco Use     Smoking status: Never Smoker     Smokeless tobacco: Never Used   Substance Use Topics     Alcohol use: Yes     Comment: 2 beers daily       OBJECTIVE:  BP (!) 162/98   Pulse 79   Temp 98.6  F (37  C) (Tympanic)   Resp 18   Wt 125.2 kg (276 lb)   SpO2 98%   BMI 35.44 kg/m       BP Readings from Last 6 Encounters:   02/21/22 (!) 162/98   01/20/22 (!) 160/110   01/04/22 (!) 181/141   12/12/21 (!) 168/126   01/27/17 148/86   05/01/14 134/84         Heart: negative, PMI normal. No lifts, heaves, or thrills. RRR. No murmurs, clicks gallops or rub  Lower Extremities:Trace edema on right and Trace  edema on the left        No results found for any visits on 02/21/22.    The 10-year ASCVD risk score (Garlandadrienne DENNIS Jr., et al., 2013) is:  5.7%    Values used to calculate the score:      Age: 51 years      Sex: Male      Is Non- : No      Diabetic: No      Tobacco smoker: No      Systolic Blood Pressure: 197 mmHg      Is BP treated: Yes      HDL Cholesterol: 54 mg/dL      Total Cholesterol: 157 mg/dL    ASSESSMENT:  Essential hypertension which is poorly controlled.       Plan:  - Medication:increase the dose of diltiazem  to 300 mg.    The patient was advised to do the following therapuetic life style changes  - Dietary sodium restriction and increase potassium and Calcium intake  - Regular aerobic exercise  - Weight loss  - Discontinue smoking if applicable  - Avoid regular NSAID use if applicable  - Avoid regular decongestant use if applicable  - Follow up in clinic in 4 weeks for a recheck  - Check a basic metabolic panel today    Patient Education: Reviewed risks of hypertension and principles of   Treatment.

## 2022-02-21 NOTE — NURSING NOTE
".  Chief Complaint   Patient presents with     Hypertension       Initial BP (!) 197/117   Pulse 79   Temp 98.6  F (37  C) (Tympanic)   Resp 18   Wt 125.2 kg (276 lb)   SpO2 98%   BMI 35.44 kg/m   Estimated body mass index is 35.44 kg/m  as calculated from the following:    Height as of 1/20/22: 1.88 m (6' 2\").    Weight as of this encounter: 125.2 kg (276 lb).  Medication Reconciliation: complete  Kathi Ibanez CMA  "

## 2022-03-28 ENCOUNTER — OFFICE VISIT (OUTPATIENT)
Dept: FAMILY MEDICINE | Facility: CLINIC | Age: 52
End: 2022-03-28
Payer: COMMERCIAL

## 2022-03-28 VITALS
SYSTOLIC BLOOD PRESSURE: 148 MMHG | OXYGEN SATURATION: 99 % | RESPIRATION RATE: 18 BRPM | HEART RATE: 89 BPM | WEIGHT: 277 LBS | DIASTOLIC BLOOD PRESSURE: 93 MMHG | TEMPERATURE: 98.1 F | BODY MASS INDEX: 35.56 KG/M2

## 2022-03-28 DIAGNOSIS — I48.19 PERSISTENT ATRIAL FIBRILLATION (H): ICD-10-CM

## 2022-03-28 DIAGNOSIS — Z12.11 SCREEN FOR COLON CANCER: Primary | ICD-10-CM

## 2022-03-28 DIAGNOSIS — I49.9 IRREGULAR HEART RHYTHM: ICD-10-CM

## 2022-03-28 LAB
ALBUMIN SERPL-MCNC: 3.8 G/DL (ref 3.4–5)
ALP SERPL-CCNC: 56 U/L (ref 40–150)
ALT SERPL W P-5'-P-CCNC: 29 U/L (ref 0–70)
ANION GAP SERPL CALCULATED.3IONS-SCNC: 9 MMOL/L (ref 3–14)
AST SERPL W P-5'-P-CCNC: 17 U/L (ref 0–45)
BILIRUB SERPL-MCNC: 0.5 MG/DL (ref 0.2–1.3)
BUN SERPL-MCNC: 14 MG/DL (ref 7–30)
CALCIUM SERPL-MCNC: 8.9 MG/DL (ref 8.5–10.1)
CHLORIDE BLD-SCNC: 107 MMOL/L (ref 94–109)
CO2 SERPL-SCNC: 23 MMOL/L (ref 20–32)
CREAT SERPL-MCNC: 1 MG/DL (ref 0.66–1.25)
GFR SERPL CREATININE-BSD FRML MDRD: >90 ML/MIN/1.73M2
GLUCOSE BLD-MCNC: 125 MG/DL (ref 70–99)
MAGNESIUM SERPL-MCNC: 2.2 MG/DL (ref 1.6–2.3)
POTASSIUM BLD-SCNC: 3.8 MMOL/L (ref 3.4–5.3)
PROT SERPL-MCNC: 7.3 G/DL (ref 6.8–8.8)
SODIUM SERPL-SCNC: 139 MMOL/L (ref 133–144)
T4 FREE SERPL-MCNC: 0.98 NG/DL (ref 0.76–1.46)
TSH SERPL DL<=0.005 MIU/L-ACNC: 4.41 MU/L (ref 0.4–4)

## 2022-03-28 PROCEDURE — 93000 ELECTROCARDIOGRAM COMPLETE: CPT | Performed by: FAMILY MEDICINE

## 2022-03-28 PROCEDURE — 36415 COLL VENOUS BLD VENIPUNCTURE: CPT | Performed by: FAMILY MEDICINE

## 2022-03-28 PROCEDURE — 80053 COMPREHEN METABOLIC PANEL: CPT | Performed by: FAMILY MEDICINE

## 2022-03-28 PROCEDURE — 83735 ASSAY OF MAGNESIUM: CPT | Performed by: FAMILY MEDICINE

## 2022-03-28 PROCEDURE — 84443 ASSAY THYROID STIM HORMONE: CPT | Performed by: FAMILY MEDICINE

## 2022-03-28 PROCEDURE — 99214 OFFICE O/P EST MOD 30 MIN: CPT | Performed by: FAMILY MEDICINE

## 2022-03-28 PROCEDURE — 84439 ASSAY OF FREE THYROXINE: CPT | Performed by: FAMILY MEDICINE

## 2022-03-28 RX ORDER — ASPIRIN 325 MG
325 TABLET, DELAYED RELEASE (ENTERIC COATED) ORAL DAILY
Qty: 90 TABLET | Refills: 3 | COMMUNITY
Start: 2022-03-28 | End: 2022-05-13

## 2022-03-28 ASSESSMENT — PAIN SCALES - GENERAL: PAINLEVEL: NO PAIN (0)

## 2022-03-28 ASSESSMENT — PATIENT HEALTH QUESTIONNAIRE - PHQ9
SUM OF ALL RESPONSES TO PHQ QUESTIONS 1-9: 0
SUM OF ALL RESPONSES TO PHQ QUESTIONS 1-9: 0
10. IF YOU CHECKED OFF ANY PROBLEMS, HOW DIFFICULT HAVE THESE PROBLEMS MADE IT FOR YOU TO DO YOUR WORK, TAKE CARE OF THINGS AT HOME, OR GET ALONG WITH OTHER PEOPLE: NOT DIFFICULT AT ALL

## 2022-03-28 NOTE — PROGRESS NOTES
Answers for HPI/ROS submitted by the patient on 3/28/2022  If you checked off any problems, how difficult have these problems made it for you to do your work, take care of things at home, or get along with other people?: Not difficult at all  PHQ9 TOTAL SCORE: 0  Do you check your blood pressure regularly outside of the clinic?: No  Are your blood pressures ever more than 140 on the top number (systolic) OR more than 90 on the bottom number (diastolic)? (For example, greater than 140/90): Yes  Are you following a low salt diet?: Yes  How many minutes a day do you exercise enough to make your heart beat faster?: 60 or more  How many days a week do you exercise enough to make your heart beat faster?: 5  How many days per week do you miss taking your medication?: 0      SUBJECTIVE:  Rafiq Domínguez is an 51 year old male who presents for a follow up evaluation of his hypertension.The patient had the dose of diltiazem raised to 300 mg at the last visit. The patient reports that he IS taking the medication as prescribed. He denies side effects of medication.    Patient Active Problem List   Diagnosis     Obesity     CARDIOVASCULAR SCREENING; LDL GOAL LESS THAN 160     Hypertension goal BP (blood pressure) < 140/90     Gout, unspecified cause, unspecified chronicity, unspecified site     Erectile dysfunction, unspecified erectile dysfunction type     Morbid obesity (H)       Is the HYPERTENSION goal on the problem list? Yes      Use of agents associated with hypertension: none  Current Outpatient Medications   Medication     allopurinol (ZYLOPRIM) 300 MG tablet     aspirin (ASA) 81 MG EC tablet     colchicine (COLCRYS) 0.6 MG tablet     diltiazem ER (TIAZAC) 300 MG 24 hr ER beaded capsule     lisinopril (ZESTRIL) 40 MG tablet     sildenafil (VIAGRA) 100 MG cap/tab     triamcinolone (KENALOG) 0.1 % external cream     No current facility-administered medications for this visit.         Allergies   Allergen Reactions      Nkda [No Known Drug Allergies]        Social History     Tobacco Use     Smoking status: Never Smoker     Smokeless tobacco: Never Used   Substance Use Topics     Alcohol use: Yes     Comment: 2 beers daily       OBJECTIVE:  BP (!) 148/93   Pulse 89   Temp 98.1  F (36.7  C) (Tympanic)   Resp 18   Wt 125.6 kg (277 lb)   SpO2 99%   BMI 35.56 kg/m       BP Readings from Last 6 Encounters:   03/28/22 (!) 148/93   02/21/22 (!) 162/98   01/20/22 (!) 160/110   01/04/22 (!) 181/141   12/12/21 (!) 168/126   01/27/17 148/86         Heart: negative, PMI normal. No lifts, heaves, or thrills. RRR. No murmurs, clicks gallops or rub, positive findings: irregularly irregular rhythm  Lower Extremities:No edema on right and No  edema on the left        No results found for any visits on 03/28/22.    The 10-year ASCVD risk score (Waban DC Jr., et al., 2013) is: 5.4%    Values used to calculate the score:      Age: 51 years      Sex: Male      Is Non- : No      Diabetic: No      Tobacco smoker: No      Systolic Blood Pressure: 191 mmHg      Is BP treated: Yes      HDL Cholesterol: 54 mg/dL      Total Cholesterol: 157 mg/dL     EKG:          EKG Interpretation:      Interpreted by Alvaro Pires MD     Symptoms at time of EKG: None   Rhythm: Atrial fibrillation - controlled  Rate: 90  Axis: Normal  Ectopy: None        Clinical Impression: atrial fibrillation (new onset)    Normal sinus rhythym, normal axis, non specific ST-T changes    CHADVASC score was 1 for HYPERTENSION     ASSESSMENT:  Essential hypertension which is marginally controlled.     Atrial fibrillation which is rate controlled      Plan:  - Medication:continue the current doses of medication.  Start aspirin 325 mg daily     (I48.19) Persistent atrial fibrillation (H)  Comment:   Plan: Echocardiogram Complete, Magnesium,         Comprehensive metabolic panel (BMP + Alb, Alk         Phos, ALT, AST, Total. Bili, TP), TSH with free        T4  reflex, aspirin (ASA) 325 MG EC tablet,         Adult Cardiology Eval  Referral

## 2022-03-28 NOTE — LETTER
"March 30, 2022      Rafiq Domínguez  [address] [facility] [address]        Dear Rafiq,   Your thyroid test result is back and it shows an abnormality that is consistent with what we call \"subclinical hypothyroidism\". This is the case when the TSH is elevated but the active hormone known as T4 is in ample supply. It is likely that someday you will need thyroid replacement medication. We need to recheck your thyroid in 1 year     The results of your glucose (blood sugar) test was slightly high and in the range of what is called Impaired Glucose Tolerance or Prediabetes. This means that you are at a higher risk to develop type 2 diabetes in the future. Regular aerobic exercise and weight loss are the best ways to prevent yourself from having full blown diabetes. We should recheck this lab test every year at your physical.   The rest of your labs were normal.     Alvaro Pires MD       Resulted Orders   TSH with free T4 reflex   Result Value Ref Range    TSH 4.41 (H) 0.40 - 4.00 mU/L   Comprehensive metabolic panel (BMP + Alb, Alk Phos, ALT, AST, Total. Bili, TP)   Result Value Ref Range    Sodium 139 133 - 144 mmol/L    Potassium 3.8 3.4 - 5.3 mmol/L    Chloride 107 94 - 109 mmol/L    Carbon Dioxide (CO2) 23 20 - 32 mmol/L    Anion Gap 9 3 - 14 mmol/L    Urea Nitrogen 14 7 - 30 mg/dL    Creatinine 1.00 0.66 - 1.25 mg/dL    Calcium 8.9 8.5 - 10.1 mg/dL    Glucose 125 (H) 70 - 99 mg/dL    Alkaline Phosphatase 56 40 - 150 U/L    AST 17 0 - 45 U/L    ALT 29 0 - 70 U/L    Protein Total 7.3 6.8 - 8.8 g/dL    Albumin 3.8 3.4 - 5.0 g/dL    Bilirubin Total 0.5 0.2 - 1.3 mg/dL    GFR Estimate >90 >60 mL/min/1.73m2      Comment:      Effective December 21, 2021 eGFRcr in adults is calculated using the 2021 CKD-EPI creatinine equation which includes age and gender (Russell lizarraga al., [place], DOI: 10.1056/LKJBqq6455972)   Magnesium   Result Value Ref Range    Magnesium 2.2 1.6 - 2.3 mg/dL   T4 free   Result Value Ref Range    " Free T4 0.98 0.76 - 1.46 ng/dL

## 2022-03-28 NOTE — PATIENT INSTRUCTIONS
Please call our [facility] Scheduling Line at [phone] to schedule your:    Echocardiogram

## 2022-03-29 PROBLEM — E03.8 SUBCLINICAL HYPOTHYROIDISM: Status: ACTIVE | Noted: 2022-03-29

## 2022-03-29 ASSESSMENT — PATIENT HEALTH QUESTIONNAIRE - PHQ9: SUM OF ALL RESPONSES TO PHQ QUESTIONS 1-9: 0

## 2022-04-19 DIAGNOSIS — I10 HYPERTENSION GOAL BP (BLOOD PRESSURE) < 140/90: ICD-10-CM

## 2022-04-19 RX ORDER — DILTIAZEM HYDROCHLORIDE 300 MG/1
CAPSULE, EXTENDED RELEASE ORAL
Qty: 30 CAPSULE | Refills: 0 | Status: SHIPPED | OUTPATIENT
Start: 2022-04-19 | End: 2022-05-13

## 2022-05-13 ENCOUNTER — ANCILLARY PROCEDURE (OUTPATIENT)
Dept: CARDIOLOGY | Facility: CLINIC | Age: 52
End: 2022-05-13
Attending: FAMILY MEDICINE
Payer: COMMERCIAL

## 2022-05-13 VITALS
HEART RATE: 102 BPM | DIASTOLIC BLOOD PRESSURE: 99 MMHG | SYSTOLIC BLOOD PRESSURE: 152 MMHG | WEIGHT: 273.8 LBS | BODY MASS INDEX: 35.15 KG/M2 | OXYGEN SATURATION: 98 %

## 2022-05-13 DIAGNOSIS — I10 PRIMARY HYPERTENSION: ICD-10-CM

## 2022-05-13 DIAGNOSIS — E66.01 MORBID OBESITY (H): ICD-10-CM

## 2022-05-13 DIAGNOSIS — I10 HYPERTENSION, UNCONTROLLED: ICD-10-CM

## 2022-05-13 DIAGNOSIS — I48.19 PERSISTENT ATRIAL FIBRILLATION (H): Primary | ICD-10-CM

## 2022-05-13 DIAGNOSIS — I48.19 PERSISTENT ATRIAL FIBRILLATION (H): ICD-10-CM

## 2022-05-13 DIAGNOSIS — I10 HYPERTENSION GOAL BP (BLOOD PRESSURE) < 140/90: ICD-10-CM

## 2022-05-13 LAB — LVEF ECHO: NORMAL

## 2022-05-13 PROCEDURE — 99417 PROLNG OP E/M EACH 15 MIN: CPT | Performed by: INTERNAL MEDICINE

## 2022-05-13 PROCEDURE — 93306 TTE W/DOPPLER COMPLETE: CPT | Performed by: INTERNAL MEDICINE

## 2022-05-13 PROCEDURE — 99205 OFFICE O/P NEW HI 60 MIN: CPT | Performed by: INTERNAL MEDICINE

## 2022-05-13 RX ORDER — LABETALOL 200 MG/1
200 TABLET, FILM COATED ORAL 2 TIMES DAILY
Qty: 180 TABLET | Refills: 3 | Status: SHIPPED | OUTPATIENT
Start: 2022-05-13 | End: 2023-01-23

## 2022-05-13 RX ORDER — DILTIAZEM HYDROCHLORIDE 360 MG/1
360 CAPSULE, EXTENDED RELEASE ORAL DAILY
Qty: 90 CAPSULE | Refills: 3 | Status: SHIPPED | OUTPATIENT
Start: 2022-05-13 | End: 2022-05-13

## 2022-05-13 RX ORDER — SPIRONOLACTONE 25 MG/1
25 TABLET ORAL DAILY
Qty: 90 TABLET | Refills: 3 | Status: SHIPPED | OUTPATIENT
Start: 2022-05-13 | End: 2022-08-15

## 2022-05-13 NOTE — PROGRESS NOTES
[facility]      Referring provider:Alvaro Pires MD      HPI: Mr. Rafiq Domínguez is a 51 year old  male with PMH significant for    -Obesity  -Uncontrolled hypertension  -Alcohol abuse  -Persistent atrial fibrillation (duration unknown)    Patient is being seen today for newly diagnosed likely persistent atrial fibrillation.  Patient reports history of hypertension for few years but recently started blood pressure treatment.  He is currently on diltiazem 300 mg and lisinopril 40 mg over the last 2 month or so.  He does not monitor his blood pressure at home.  Still elevated as seen from clinic visits.    He is asymptomatic from cardiac standpoint.  Denies chest pain, shortness of breath, dizziness, palpitations, lower extremity edema.    He is chewing tobacco.    Drinks 5-6 beers (tells me that sometimes even more) per day.      I reviewed patient's EKG from 3/28/2022 which shows atrial fibrillation heart rate at 84 bpm.  Denies prior history of A. fib.  No prior EKG in the chart.  No prior history of diabetes, sleep apnea or stroke.    I reviewed patient's echocardiogram from today which shows normal biventricular function, mild LVH, severely enlarged left atrium    Medications, personal, family, and social history reviewed with patient and revised.    PAST MEDICAL HISTORY:  Past Medical History:   Diagnosis Date     Gout        CURRENT MEDICATIONS:  Current Outpatient Medications   Medication Sig Dispense Refill     allopurinol (ZYLOPRIM) 300 MG tablet Take 1 tablet (300 mg) by mouth daily 90 tablet 3     aspirin (ASA) 325 MG EC tablet Take 1 tablet (325 mg) by mouth daily 90 tablet 3     colchicine (COLCRYS) 0.6 MG tablet 2 tabs at the onset of flare, then 1 tab every 2 hrs until pain is better or diarrhea occurs, up to 10 doses. Wait 3 days until taking again  30 tablet 3     lisinopril (ZESTRIL) 40 MG tablet Take 1 tablet (40 mg) by mouth daily 90 tablet 3     TIADYLT  MG 24 hr ER beaded capsule TAKE 1 CAPSULE(300 MG) BY MOUTH DAILY 30 capsule 0     triamcinolone (KENALOG) 0.1 % external cream Apply topically 2 times daily Apply topically twice a day to the affected area Avoid contact with skin of face, armpits and groin 100 g 1     sildenafil (VIAGRA) 100 MG cap/tab Take 0.5-1 tablets ( mg) by mouth daily as needed for erectile dysfunction Take 30 min to 4 hours before intercourse. (Patient not taking: Reported on 5/13/2022) 6 tablet 3       PAST SURGICAL HISTORY:  No past surgical history on file.    ALLERGIES:     Allergies   Allergen Reactions     Nkda [No Known Drug Allergies]        FAMILY HISTORY:  No family history on file.      SOCIAL HISTORY:  Social History     Tobacco Use     Smoking status: Never Smoker     Smokeless tobacco: Never Used   Vaping Use     Vaping Use: Never used   Substance Use Topics     Alcohol use: Yes     Comment: 2 beers daily     Drug use: No       ROS:   Constitutional: No fever, chills, or sweats. Weight stable.   Cardiovascular: As per HPI.       Exam:  BP (!) 152/99 (BP Location: Left arm, Patient Position: Sitting, Cuff Size: Adult Large)   Pulse 102   Wt 124.2 kg (273 lb 12.8 oz)   SpO2 98%   BMI 35.15 kg/m    GENERAL APPEARANCE: alert and no distress  HEENT: no icterus, no central cyanosis  LYMPH/NECK: no adenopathy, no asymmetry, JVP not elevated, no carotid bruits.  RESPIRATORY: lungs clear to auscultation - no rales, rhonchi or wheezes, no use of accessory muscles, no retractions, respirations are unlabored, normal respiratory rate  CARDIOVASCULAR: irregular rhythm, normal S1, S2, no S3 or S4 and no murmur, click or rub, precordium quiet with normal PMI.  GI: soft, non tender  EXTREMITIES: no edema  NEURO: alert, normal speech,and affect  SKIN: no ecchymoses, no rashes     I have reviewed the labs and personally  reviewed the imaging below and made my comment in the assessment and plan.    Labs:  CBC RESULTS:   No results found for: WBC, RBC, HGB, HCT, MCV, MCH, MCHC, RDW, PLT    BMP RESULTS:  Lab Results   Component Value Date     03/28/2022     05/01/2014    POTASSIUM 3.8 03/28/2022    POTASSIUM 4.3 05/01/2014    CHLORIDE 107 03/28/2022    CHLORIDE 104 05/01/2014    CO2 23 03/28/2022    CO2 24 05/01/2014    ANIONGAP 9 03/28/2022    ANIONGAP 11 05/01/2014     (H) 03/28/2022     (H) 05/01/2014    BUN 14 03/28/2022    BUN 15 05/01/2014    CR 1.00 03/28/2022    CR 0.98 05/01/2014    GFRESTIMATED >90 03/28/2022    GFRESTIMATED 84 05/01/2014    GFRESTBLACK >90 05/01/2014    FRANKIE 8.9 03/28/2022    FRANKIE 9.1 05/01/2014      Echocardiogram 5/13/2022  Technically difficult study limited views obtained due to body habitus     Left ventricular size, wall motion and function are normal. The ejection  fraction is 55-60%. Mild to moderate concentric wall thickening consistent  with left ventricular hypertrophy.     Right ventricular function, chamber size, wall motion, and thickness are  normal.     Severe left atrial enlargement is present.     No significant valvular abnormalities present.     The inferior vena cava is normal.     No pericardial effusion is present.     There is no prior study for direct comparison.      EKG 3/28/2022 shows atrial fibrillation, heart rate well controlled, otherwise unremarkable          Assessment and Plan:     #Persistent atrial fibrillation  -Duration unknown.  He is in A. fib in clinic today by physical exam.  He is asymptomatic from A. Fib.  Echo from today is unremarkable except mild LVH.  I will continue rate control for now.  -A. fib is likely multifactorial due to uncontrolled hypertension, obesity and heavy alcohol use.  -Discussed lifestyle changes with the patient  -Recommend to stop drinking alcohol and lose weight  -Recommended sleep study to rule out sleep  apnea  -He is on rate control with diltiazem.  He has history of gout and there is interaction with diltiazem and colchicine.    -Recommend to stop diltiazem and switch to labetalol 200 mg twice daily  -Patient's [id number] score is 1 given history of hypertension.  Oral anticoagulation is class IIb indication.  I have discussed with the patient that he might benefit from oral anticoagulation.  He is hesitant to decide at this time.  Since aspirin is not recommended anymore for stroke prevention I will discontinue aspirin.  I have discussed warfarin and direct oral anticoagulants.  The patient will check with his insurance if direct oral anticoagulants are covered.    #Hypertension  -Multifactorial.  Obesity, alcohol are contributing.  Discussed with the patient all these.  -Continue lisinopril 40 mg.  -Start labetalol 200 mg twice daily  -Stop diltiazem due to interaction with colchicine  -Start  spironolactone 25 mg daily  -BMP in 2 weeks  -Recommend to see PCP in 3 months to recheck blood pressure control    #Chewing tobacco  #Daily alcohol use  -Counseled patient to stop chewing tobacco and alcohol use.    Return to clinic 6 months    Total time spent today for this visit is 116 minutes including precharting, face-to-face clinic visit, review of labs/imaging and medical documentation.    Please donot hesitate to contact me if you have any questions or concerns. Again, thank you for allowing me to participate in the care of your patient.    Mariaelena BAR MD  [facility] Division of Cardiology  Pager [phone]

## 2022-05-13 NOTE — NURSING NOTE
STOP Aspirin   STOP Diltiazem  START taking Spironolactone 25 mg daily  START taking Labetalol 200 mg BID    Recheck BMP in 2 weeks.     Sleep study referral placed.     Les Parekh, RN   Cardiology Nurse Coordinator

## 2022-05-13 NOTE — LETTER
5/13/2022      RE: Rafiq Domínguez  [address] [facility] [place] [address]       Dear Colleague,    Thank you for the opportunity to participate in the care of your patient, Rafiq Domínguez, at the [facility]. Please see a copy of my visit note below.                                                                                                                                 General Cardiology Clinic-[place]      Referring provider:Alvaro Pires MD      HPI: . Rafiq Domínguez is a 51 year old  male with PMH significant for    -Obesity  -Uncontrolled hypertension  -Alcohol abuse  -Persistent atrial fibrillation (duration unknown)    Patient is being seen today for newly diagnosed likely persistent atrial fibrillation.  Patient reports history of hypertension for few years but recently started blood pressure treatment.  He is currently on diltiazem 300 mg and lisinopril 40 mg over the last 2 month or so.  He does not monitor his blood pressure at home.  Still elevated as seen from clinic visits.    He is asymptomatic from cardiac standpoint.  Denies chest pain, shortness of breath, dizziness, palpitations, lower extremity edema.    He is chewing tobacco.    Drinks 5-6 beers (tells me that sometimes even more) per day.      I reviewed patient's EKG from 3/28/2022 which shows atrial fibrillation heart rate at 84 bpm.  Denies prior history of A. fib.  No prior EKG in the chart.  No prior history of diabetes, sleep apnea or stroke.    I reviewed patient's echocardiogram from today which shows normal biventricular function, mild LVH, severely enlarged left atrium    Medications, personal, family, and social history reviewed with patient and revised.    PAST MEDICAL HISTORY:  Past Medical History:   Diagnosis Date     Gout        CURRENT MEDICATIONS:  Current Outpatient Medications   Medication Sig Dispense Refill     allopurinol  (ZYLOPRIM) 300 MG tablet Take 1 tablet (300 mg) by mouth daily 90 tablet 3     aspirin (ASA) 325 MG EC tablet Take 1 tablet (325 mg) by mouth daily 90 tablet 3     colchicine (COLCRYS) 0.6 MG tablet 2 tabs at the onset of flare, then 1 tab every 2 hrs until pain is better or diarrhea occurs, up to 10 doses. Wait 3 days until taking again 30 tablet 3     lisinopril (ZESTRIL) 40 MG tablet Take 1 tablet (40 mg) by mouth daily 90 tablet 3     TIADYLT  MG 24 hr ER beaded capsule TAKE 1 CAPSULE(300 MG) BY MOUTH DAILY 30 capsule 0     triamcinolone (KENALOG) 0.1 % external cream Apply topically 2 times daily Apply topically twice a day to the affected area Avoid contact with skin of face, armpits and groin 100 g 1     sildenafil (VIAGRA) 100 MG cap/tab Take 0.5-1 tablets ( mg) by mouth daily as needed for erectile dysfunction Take 30 min to 4 hours before intercourse. (Patient not taking: Reported on 5/13/2022) 6 tablet 3       PAST SURGICAL HISTORY:  No past surgical history on file.    ALLERGIES:     Allergies   Allergen Reactions     Nkda [No Known Drug Allergies]        FAMILY HISTORY:  No family history on file.      SOCIAL HISTORY:  Social History     Tobacco Use     Smoking status: Never Smoker     Smokeless tobacco: Never Used   Vaping Use     Vaping Use: Never used   Substance Use Topics     Alcohol use: Yes     Comment: 2 beers daily     Drug use: No       ROS:   Constitutional: No fever, chills, or sweats. Weight stable.   Cardiovascular: As per HPI.       Exam:  BP (!) 152/99 (BP Location: Left arm, Patient Position: Sitting, Cuff Size: Adult Large)   Pulse 102   Wt 124.2 kg (273 lb 12.8 oz)   SpO2 98%   BMI 35.15 kg/m    GENERAL APPEARANCE: alert and no distress  HEENT: no icterus, no central cyanosis  LYMPH/NECK: no adenopathy, no asymmetry, JVP not elevated, no carotid bruits.  RESPIRATORY: lungs clear to auscultation - no rales, rhonchi or wheezes, no use of accessory muscles, no  retractions, respirations are unlabored, normal respiratory rate  CARDIOVASCULAR: irregular rhythm, normal S1, S2, no S3 or S4 and no murmur, click or rub, precordium quiet with normal PMI.  GI: soft, non tender  EXTREMITIES: no edema  NEURO: alert, normal speech,and affect  SKIN: no ecchymoses, no rashes     I have reviewed the labs and personally reviewed the imaging below and made my comment in the assessment and plan.    Labs:  CBC RESULTS:   No results found for: WBC, RBC, HGB, HCT, MCV, MCH, MCHC, RDW, PLT    BMP RESULTS:  Lab Results   Component Value Date     03/28/2022     05/01/2014    POTASSIUM 3.8 03/28/2022    POTASSIUM 4.3 05/01/2014    CHLORIDE 107 03/28/2022    CHLORIDE 104 05/01/2014    CO2 23 03/28/2022    CO2 24 05/01/2014    ANIONGAP 9 03/28/2022    ANIONGAP 11 05/01/2014     (H) 03/28/2022     (H) 05/01/2014    BUN 14 03/28/2022    BUN 15 05/01/2014    CR 1.00 03/28/2022    CR 0.98 05/01/2014    GFRESTIMATED >90 03/28/2022    GFRESTIMATED 84 05/01/2014    GFRESTBLACK >90 05/01/2014    FRANKIE 8.9 03/28/2022    FRANKIE 9.1 05/01/2014      Echocardiogram 5/13/2022  Technically difficult study limited views obtained due to body habitus     Left ventricular size, wall motion and function are normal. The ejection  fraction is 55-60%. Mild to moderate concentric wall thickening consistent  with left ventricular hypertrophy.     Right ventricular function, chamber size, wall motion, and thickness are  normal.     Severe left atrial enlargement is present.     No significant valvular abnormalities present.     The inferior vena cava is normal.     No pericardial effusion is present.     There is no prior study for direct comparison.      EKG 3/28/2022 shows atrial fibrillation, heart rate well controlled, otherwise unremarkable          Assessment and Plan:     #Persistent atrial fibrillation  -Duration unknown.  He is in A. fib in clinic today by physical exam.  He is asymptomatic  from A. Fib.  Echo from today is unremarkable except mild LVH.  I will continue rate control for now.  -A. fib is likely multifactorial due to uncontrolled hypertension, obesity and heavy alcohol use.  -Discussed lifestyle changes with the patient  -Recommend to stop drinking alcohol and lose weight  -Recommended sleep study to rule out sleep apnea  -He is on rate control with diltiazem.  He has history of gout and there is interaction with diltiazem and colchicine.    -Recommend to stop diltiazem and switch to labetalol 200 mg twice daily  -Patient's [id number] score is 1 given history of hypertension.  Oral anticoagulation is class IIb indication.  I have discussed with the patient that he might benefit from oral anticoagulation.  He is hesitant to decide at this time.  Since aspirin is not recommended anymore for stroke prevention I will discontinue aspirin.  I have discussed warfarin and direct oral anticoagulants.  The patient will check with his insurance if direct oral anticoagulants are covered.    #Hypertension  -Multifactorial.  Obesity, alcohol are contributing.  Discussed with the patient all these.  -Continue lisinopril 40 mg.  -Start labetalol 200 mg twice daily  -Stop diltiazem due to interaction with colchicine  -Start  spironolactone 25 mg daily  -BMP in 2 weeks  -Recommend to see PCP in 3 months to recheck blood pressure control    #Chewing tobacco  #Daily alcohol use  -Counseled patient to stop chewing tobacco and alcohol use.    Return to clinic 6 months    Total time spent today for this visit is 116 minutes including precharting, face-to-face clinic visit, review of labs/imaging and medical documentation.    Please donot hesitate to contact me if you have any questions or concerns. Again, thank you for allowing me to participate in the care of your patient.    Mariaelena BAR MD  [facility] Division of Cardiology  Pager [phone]

## 2022-05-13 NOTE — PATIENT INSTRUCTIONS
Thank you for coming to the [facility] [place]; please note the following instructions:    1. STOP taking Aspirin     2. STOP taking Diltiazem    3.START taking Spironolactone 25 mg tablets daily    4. START taking Labetalol 200 mg 2 times daily    5. BMP labs in 2 weeks    6. Dr. Ortega referred you for a Sleep Study, they will call you in 2 day if not give them a call to scheduled.    7. Monitor your blood pressure and record your reading     8. Follow up with Primary Care in 3 months    9. 6 Months follow up with Dr. Ortega as scheduled    10. Dr. Ortega recommend weight loss    11. Medication discuss today for your own research are:  Eliquis   Xarelto   Pradaxa           If you have any questions regarding your visit please contact your care team:     Cardiology  Telephone Number   Tami LILA., RN  Elva WOLF, RN   Tresa VALLADARES, RMA  Laila VIZCARRA, RMA  Manuela Arenas., [facility]  Cayla GUTIÉRREZ, Visit Facilitator   [phone] (option 1)   For scheduling appts:     [phone] (select option 1)       For the Device Clinic (Pacemakers and ICD's)  RN's :  Joanna Moore   During business hours: [phone]    *After business hours:  [phone] (select option 4)      Normal test result notifications will be released via [employer] or mailed within 7 business days.  All other test results, will be communicated via telephone once reviewed by your cardiologist.    If you need a medication refill please contact your pharmacy.  Please allow 3 business days for your refill to be completed.    As always, thank you for trusting us with your health care needs!

## 2022-08-15 ENCOUNTER — OFFICE VISIT (OUTPATIENT)
Dept: FAMILY MEDICINE | Facility: CLINIC | Age: 52
End: 2022-08-15
Payer: COMMERCIAL

## 2022-08-15 ENCOUNTER — TELEPHONE (OUTPATIENT)
Dept: FAMILY MEDICINE | Facility: CLINIC | Age: 52
End: 2022-08-15

## 2022-08-15 VITALS
DIASTOLIC BLOOD PRESSURE: 103 MMHG | SYSTOLIC BLOOD PRESSURE: 147 MMHG | WEIGHT: 275 LBS | OXYGEN SATURATION: 100 % | HEART RATE: 96 BPM | BODY MASS INDEX: 35.31 KG/M2 | TEMPERATURE: 98.5 F | RESPIRATION RATE: 18 BRPM

## 2022-08-15 DIAGNOSIS — Z11.4 SCREENING FOR HIV (HUMAN IMMUNODEFICIENCY VIRUS): ICD-10-CM

## 2022-08-15 DIAGNOSIS — I10 HYPERTENSION GOAL BP (BLOOD PRESSURE) < 140/90: Primary | ICD-10-CM

## 2022-08-15 DIAGNOSIS — I10 PRIMARY HYPERTENSION: ICD-10-CM

## 2022-08-15 DIAGNOSIS — Z12.11 SCREEN FOR COLON CANCER: ICD-10-CM

## 2022-08-15 LAB
ANION GAP SERPL CALCULATED.3IONS-SCNC: 7 MMOL/L (ref 3–14)
BUN SERPL-MCNC: 14 MG/DL (ref 7–30)
CALCIUM SERPL-MCNC: 9.5 MG/DL (ref 8.5–10.1)
CHLORIDE BLD-SCNC: 108 MMOL/L (ref 94–109)
CO2 SERPL-SCNC: 27 MMOL/L (ref 20–32)
CREAT SERPL-MCNC: 1.14 MG/DL (ref 0.66–1.25)
GFR SERPL CREATININE-BSD FRML MDRD: 78 ML/MIN/1.73M2
GLUCOSE BLD-MCNC: 137 MG/DL (ref 70–99)
POTASSIUM BLD-SCNC: 4.5 MMOL/L (ref 3.4–5.3)
SODIUM SERPL-SCNC: 142 MMOL/L (ref 133–144)

## 2022-08-15 PROCEDURE — 36415 COLL VENOUS BLD VENIPUNCTURE: CPT | Performed by: FAMILY MEDICINE

## 2022-08-15 PROCEDURE — 99213 OFFICE O/P EST LOW 20 MIN: CPT | Performed by: FAMILY MEDICINE

## 2022-08-15 PROCEDURE — 80048 BASIC METABOLIC PNL TOTAL CA: CPT | Performed by: FAMILY MEDICINE

## 2022-08-15 RX ORDER — SPIRONOLACTONE 50 MG/1
50 TABLET, FILM COATED ORAL DAILY
Qty: 30 TABLET | Refills: 1 | Status: SHIPPED | OUTPATIENT
Start: 2022-08-15 | End: 2022-10-14

## 2022-08-15 ASSESSMENT — PAIN SCALES - GENERAL: PAINLEVEL: NO PAIN (0)

## 2022-08-15 NOTE — PROGRESS NOTES
Subjective   Rafiq is a 51 year old, presenting for the following health issues:  Hypertension and RECHECK      History of Present Illness       Hypertension: He presents for follow up of hypertension.  He does not check blood pressure  regularly outside of the clinic. Outside blood pressures have been over 140/90. He does not follow a low salt diet.               Review of Systems         Objective    There were no vitals taken for this visit.  There is no height or weight on file to calculate BMI.  Physical Exam                     .  ..   --------------------------------------------------------------------------------------------------------------------------------------    SUBJECTIVE:  Rafiq Domínguez is an 51 year old male who presents for a follow up evaluation of his hypertension.The patient was started on 25 mg of spironolactone at the last visit. and had  the diltiazem discontinued by his cardiologist. The patient reports that he IS taking the medication as prescribed. He denies side effects of medication.    Patient Active Problem List   Diagnosis     Obesity     CARDIOVASCULAR SCREENING; LDL GOAL LESS THAN 160     Hypertension goal BP (blood pressure) < 140/90     Gout, unspecified cause, unspecified chronicity, unspecified site     Erectile dysfunction, unspecified erectile dysfunction type     Morbid obesity (H)     Subclinical hypothyroidism       Is the HYPERTENSION goal on the problem list? Yes      Use of agents associated with hypertension: none  Current Outpatient Medications   Medication     allopurinol (ZYLOPRIM) 300 MG tablet     colchicine (COLCRYS) 0.6 MG tablet     labetalol (NORMODYNE) 200 MG tablet     lisinopril (ZESTRIL) 40 MG tablet     spironolactone (ALDACTONE) 25 MG tablet     triamcinolone (KENALOG) 0.1 % external cream     sildenafil (VIAGRA) 100 MG cap/tab     No current facility-administered medications for this visit.         Allergies   Allergen Reactions     Nkda [No  Known Drug Allergies]        Social History     Tobacco Use     Smoking status: Never Smoker     Smokeless tobacco: Never Used   Substance Use Topics     Alcohol use: Yes     Comment: 2 beers daily       OBJECTIVE:  BP (!) 182/125   Pulse 96   Temp 98.5  F (36.9  C) (Tympanic)   Resp 18   Wt 124.7 kg (275 lb)   SpO2 100%   BMI 35.31 kg/m      Heart: negative, PMI normal. No lifts, heaves, or thrills. RRR. No murmurs, clicks gallops or rub  Lower Extremities:No edema on right and Trace  edema on the left        No results found for any visits on 08/15/22.    The 10-year ASCVD risk score (Jose DENNIS Jr., et al., 2013) is: 4.9%    Values used to calculate the score:      Age: 51 years      Sex: Male      Is Non- : No      Diabetic: No      Tobacco smoker: No      Systolic Blood Pressure: 182 mmHg      Is BP treated: Yes      HDL Cholesterol: 54 mg/dL      Total Cholesterol: 157 mg/dL    ASSESSMENT:  Essential hypertension which is marginally controlled.       Plan:  - Medication:continue the current doses of medication for now but if his potassium and renal function are normal we will double his spironolactone to 50.     The patient was advised to do the following therapuetic life style changes  - Dietary sodium restriction and increase potassium and Calcium intake  - Regular aerobic exercise  - Weight loss  - Discontinue smoking if applicable  - Avoid regular NSAID use if applicable  - Avoid regular decongestant use if applicable  - Follow up in clinic in 4 weeks for a recheck  - Check a basic metabolic panel today    Patient Education: Reviewed risks of hypertension and principles of   Treatment.

## 2022-08-16 RX ORDER — SPIRONOLACTONE 50 MG/1
TABLET, FILM COATED ORAL
Qty: 90 TABLET | OUTPATIENT
Start: 2022-08-16

## 2022-08-16 NOTE — TELEPHONE ENCOUNTER
Left message on answering machine for patient to call back to [phone].    RN please give patient provider message as written.  Katharine MÉNDEZN, RN

## 2022-08-16 NOTE — TELEPHONE ENCOUNTER
Please call the patient and let him know that his labs are normal and he can start taking the higher dose spironolactone.  He should follow-up in clinic in about 4 weeks for blood pressure check.  Alvaro Pires MD

## 2022-08-16 NOTE — TELEPHONE ENCOUNTER
Pt notified of provider message as written.  Pt verbalized good understanding.  Katharine Layne BSN, RN

## 2022-10-14 DIAGNOSIS — I10 PRIMARY HYPERTENSION: ICD-10-CM

## 2022-10-14 RX ORDER — SPIRONOLACTONE 50 MG/1
TABLET, FILM COATED ORAL
Qty: 30 TABLET | Refills: 1 | Status: SHIPPED | OUTPATIENT
Start: 2022-10-14 | End: 2022-12-19

## 2022-12-19 DIAGNOSIS — I10 PRIMARY HYPERTENSION: ICD-10-CM

## 2022-12-23 RX ORDER — SPIRONOLACTONE 50 MG/1
50 TABLET, FILM COATED ORAL DAILY
Qty: 30 TABLET | Refills: 1 | Status: SHIPPED | OUTPATIENT
Start: 2022-12-23 | End: 2023-01-23

## 2023-01-08 ENCOUNTER — HEALTH MAINTENANCE LETTER (OUTPATIENT)
Age: 53
End: 2023-01-08

## 2023-01-23 ENCOUNTER — OFFICE VISIT (OUTPATIENT)
Dept: FAMILY MEDICINE | Facility: CLINIC | Age: 53
End: 2023-01-23
Payer: COMMERCIAL

## 2023-01-23 VITALS
HEIGHT: 74 IN | SYSTOLIC BLOOD PRESSURE: 138 MMHG | TEMPERATURE: 97.4 F | RESPIRATION RATE: 18 BRPM | DIASTOLIC BLOOD PRESSURE: 95 MMHG | BODY MASS INDEX: 35.16 KG/M2 | WEIGHT: 274 LBS | OXYGEN SATURATION: 98 % | HEART RATE: 96 BPM

## 2023-01-23 DIAGNOSIS — Z12.11 COLON CANCER SCREENING: ICD-10-CM

## 2023-01-23 DIAGNOSIS — L30.9 ECZEMA, UNSPECIFIED TYPE: ICD-10-CM

## 2023-01-23 DIAGNOSIS — E66.01 MORBID OBESITY (H): ICD-10-CM

## 2023-01-23 DIAGNOSIS — I48.19 PERSISTENT ATRIAL FIBRILLATION (H): ICD-10-CM

## 2023-01-23 DIAGNOSIS — R06.83 SNORING: ICD-10-CM

## 2023-01-23 DIAGNOSIS — I10 PRIMARY HYPERTENSION: Primary | ICD-10-CM

## 2023-01-23 DIAGNOSIS — M10.9 GOUT, UNSPECIFIED CAUSE, UNSPECIFIED CHRONICITY, UNSPECIFIED SITE: ICD-10-CM

## 2023-01-23 PROCEDURE — 99214 OFFICE O/P EST MOD 30 MIN: CPT | Mod: 25 | Performed by: FAMILY MEDICINE

## 2023-01-23 PROCEDURE — 0134A COVID-19 VACCINE BIVALENT BOOSTER 18+ (MODERNA): CPT | Performed by: FAMILY MEDICINE

## 2023-01-23 PROCEDURE — 90686 IIV4 VACC NO PRSV 0.5 ML IM: CPT | Performed by: FAMILY MEDICINE

## 2023-01-23 PROCEDURE — 91313 COVID-19 VACCINE BIVALENT BOOSTER 18+ (MODERNA): CPT | Performed by: FAMILY MEDICINE

## 2023-01-23 PROCEDURE — 90471 IMMUNIZATION ADMIN: CPT | Performed by: FAMILY MEDICINE

## 2023-01-23 RX ORDER — TRIAMCINOLONE ACETONIDE 1 MG/G
CREAM TOPICAL 2 TIMES DAILY
Qty: 100 G | Refills: 1 | Status: SHIPPED | OUTPATIENT
Start: 2023-01-23 | End: 2024-03-04

## 2023-01-23 RX ORDER — LABETALOL 200 MG/1
200 TABLET, FILM COATED ORAL 2 TIMES DAILY
Qty: 180 TABLET | Refills: 3 | Status: SHIPPED | OUTPATIENT
Start: 2023-01-23 | End: 2024-03-13

## 2023-01-23 RX ORDER — LISINOPRIL 40 MG/1
40 TABLET ORAL DAILY
Qty: 90 TABLET | Refills: 3 | Status: SHIPPED | OUTPATIENT
Start: 2023-01-23 | End: 2024-04-23

## 2023-01-23 RX ORDER — ALLOPURINOL 300 MG/1
1 TABLET ORAL DAILY
Qty: 90 TABLET | Refills: 3 | Status: SHIPPED | OUTPATIENT
Start: 2023-01-23 | End: 2024-02-12

## 2023-01-23 RX ORDER — SPIRONOLACTONE 50 MG/1
50 TABLET, FILM COATED ORAL DAILY
Qty: 30 TABLET | Refills: 1 | Status: SHIPPED | OUTPATIENT
Start: 2023-01-23 | End: 2023-04-07

## 2023-01-23 ASSESSMENT — PAIN SCALES - GENERAL: PAINLEVEL: NO PAIN (0)

## 2023-01-23 NOTE — PROGRESS NOTES
"  Assessment & Plan     Primary hypertension  BP reading good at home   currently at goal   Continue Lisinopril , Labetalol and aldactone   Referral for sleep apea testing   - lisinopril (ZESTRIL) 40 MG tablet; Take 1 tablet (40 mg) by mouth daily  - spironolactone (ALDACTONE) 50 MG tablet; Take 1 tablet (50 mg) by mouth daily    Persistent atrial fibrillation (H)  Rate controlled   Follows with cardiolgoy   Continue Labetalol   - labetalol (NORMODYNE) 200 MG tablet; Take 1 tablet (200 mg) by mouth 2 times daily    Gout, unspecified cause, unspecified chronicity, unspecified site  chronic problem   no recent flare up   Continue Allopurinol   Refill provided   - allopurinol (ZYLOPRIM) 300 MG tablet; Take 1 tablet (300 mg) by mouth daily    Eczema, unspecified type    - triamcinolone (KENALOG) 0.1 % external cream; Apply topically 2 times daily Apply topically twice a day to the affected area Avoid contact with skin of face, armpits and groin    Snoring  concerning for JADYN   Referral to sleep medicine   - Adult Sleep Eval & Management  Referral; Future    Morbid obesity (H)  Body mass index is 35.18 kg/m .  274 lbs 0 oz  Contributes to hypertension   Discussed diet and exercise   Colon cancer screening    - Colonoscopy Screening  Referral; Future             BMI:   Estimated body mass index is 35.18 kg/m  as calculated from the following:    Height as of this encounter: 1.88 m (6' 2\").    Weight as of this encounter: 124.3 kg (274 lb).           Return in about 3 months (around 4/23/2023), or if symptoms worsen or fail to improve, for Follow up, Routine preventive.    Jer Rodriguez MD  [facility]    Theron Conroy is a 52 year old, presenting for the following health issues:  Hypertension      History of Present Illness       Hypertension: He presents for follow up of hypertension.  He does check blood pressure  regularly outside of the clinic.  He does not follow a low " "salt diet.       BP at home is relatively goo d  Medication:  Labetalol 200 mg BID  Lisinopril 40 mg daily   Aldactone 50 mg           Review of Systems   Constitutional, HEENT, cardiovascular, pulmonary, gi and gu systems are negative, except as otherwise noted.      Objective    BP (!) 138/95   Pulse 96   Temp 97.4  F (36.3  C) (Tympanic)   Resp 18   Ht 1.88 m (6' 2\")   Wt 124.3 kg (274 lb)   SpO2 98%   BMI 35.18 kg/m    Body mass index is 35.18 kg/m .  Physical Exam  Vitals and nursing note reviewed.   Constitutional:       General: He is not in acute distress.     Appearance: Normal appearance. He is obese. He is not ill-appearing, toxic-appearing or diaphoretic.   HENT:      Head: Normocephalic and atraumatic.   Cardiovascular:      Rate and Rhythm: Normal rate. Rhythm irregular.      Pulses: Normal pulses.      Heart sounds: Normal heart sounds. No murmur heard.    No friction rub. No gallop.   Pulmonary:      Effort: Pulmonary effort is normal. No respiratory distress.      Breath sounds: Normal breath sounds. No stridor. No wheezing or rhonchi.   Skin:     Capillary Refill: Capillary refill takes less than 2 seconds.                            "

## 2023-04-03 DIAGNOSIS — I10 PRIMARY HYPERTENSION: ICD-10-CM

## 2023-04-07 RX ORDER — SPIRONOLACTONE 50 MG/1
50 TABLET, FILM COATED ORAL DAILY
Qty: 30 TABLET | Refills: 1 | Status: SHIPPED | OUTPATIENT
Start: 2023-04-07 | End: 2023-07-11

## 2023-04-23 ENCOUNTER — HEALTH MAINTENANCE LETTER (OUTPATIENT)
Age: 53
End: 2023-04-23

## 2023-07-10 DIAGNOSIS — I10 PRIMARY HYPERTENSION: ICD-10-CM

## 2023-07-11 RX ORDER — SPIRONOLACTONE 50 MG/1
50 TABLET, FILM COATED ORAL DAILY
Qty: 30 TABLET | Refills: 1 | Status: SHIPPED | OUTPATIENT
Start: 2023-07-11 | End: 2023-09-08

## 2023-09-08 DIAGNOSIS — I10 PRIMARY HYPERTENSION: ICD-10-CM

## 2023-09-09 DIAGNOSIS — I48.19 PERSISTENT ATRIAL FIBRILLATION (H): ICD-10-CM

## 2023-09-12 RX ORDER — SPIRONOLACTONE 50 MG/1
50 TABLET, FILM COATED ORAL DAILY
Qty: 30 TABLET | Refills: 1 | Status: SHIPPED | OUTPATIENT
Start: 2023-09-12 | End: 2023-11-27

## 2023-09-12 RX ORDER — LABETALOL 200 MG/1
200 TABLET, FILM COATED ORAL 2 TIMES DAILY
Qty: 180 TABLET | Refills: 3 | OUTPATIENT
Start: 2023-09-12

## 2023-09-12 NOTE — TELEPHONE ENCOUNTER
labetalol (NORMODYNE) 200 MG tablet 180 tablet 3 1/23/2023     Pharmacy was called and they confirm that they have refills available.     Marisela Littlejohn RN

## 2023-11-09 ENCOUNTER — PATIENT OUTREACH (OUTPATIENT)
Dept: FAMILY MEDICINE | Facility: CLINIC | Age: 53
End: 2023-11-09
Payer: COMMERCIAL

## 2023-11-09 NOTE — TELEPHONE ENCOUNTER
Patient Quality Outreach    Patient is due for the following:   Breast Cancer Screening - Mammogram  Physical Preventive Adult Physical      Topic Date Due    Hepatitis B Vaccine (1 of 3 - 3-dose series) Never done    Pneumococcal Vaccine (1 - PCV) Never done    Zoster (Shingles) Vaccine (1 of 2) Never done    Flu Vaccine (1) 09/01/2023    COVID-19 Vaccine (4 - 2023-24 season) 09/01/2023       Next Steps:   Schedule a Adult Preventative    Type of outreach:    Sent [employer] message.      Questions for provider review:    None           Danae Melgar, CMA

## 2023-11-27 DIAGNOSIS — I10 PRIMARY HYPERTENSION: ICD-10-CM

## 2023-12-06 ENCOUNTER — TELEPHONE (OUTPATIENT)
Dept: FAMILY MEDICINE | Facility: CLINIC | Age: 53
End: 2023-12-06
Payer: COMMERCIAL

## 2023-12-06 DIAGNOSIS — I10 PRIMARY HYPERTENSION: ICD-10-CM

## 2023-12-06 RX ORDER — SPIRONOLACTONE 50 MG/1
50 TABLET, FILM COATED ORAL DAILY
Qty: 30 TABLET | Refills: 1 | Status: SHIPPED | OUTPATIENT
Start: 2023-12-06 | End: 2023-12-07

## 2023-12-07 RX ORDER — SPIRONOLACTONE 50 MG/1
50 TABLET, FILM COATED ORAL DAILY
Qty: 90 TABLET | Refills: 0 | Status: SHIPPED | OUTPATIENT
Start: 2023-12-07 | End: 2024-03-04

## 2024-02-12 DIAGNOSIS — M10.9 GOUT, UNSPECIFIED CAUSE, UNSPECIFIED CHRONICITY, UNSPECIFIED SITE: ICD-10-CM

## 2024-02-14 RX ORDER — ALLOPURINOL 300 MG/1
1 TABLET ORAL DAILY
Qty: 90 TABLET | Refills: 3 | Status: SHIPPED | OUTPATIENT
Start: 2024-02-14

## 2024-03-04 DIAGNOSIS — I10 PRIMARY HYPERTENSION: ICD-10-CM

## 2024-03-04 DIAGNOSIS — L30.9 ECZEMA, UNSPECIFIED TYPE: ICD-10-CM

## 2024-03-05 RX ORDER — TRIAMCINOLONE ACETONIDE 1 MG/G
CREAM TOPICAL 2 TIMES DAILY
Qty: 100 G | Refills: 1 | Status: SHIPPED | OUTPATIENT
Start: 2024-03-05

## 2024-03-05 RX ORDER — SPIRONOLACTONE 50 MG/1
50 TABLET, FILM COATED ORAL DAILY
Qty: 90 TABLET | Refills: 0 | Status: SHIPPED | OUTPATIENT
Start: 2024-03-05 | End: 2024-06-05

## 2024-03-13 DIAGNOSIS — I48.19 PERSISTENT ATRIAL FIBRILLATION (H): ICD-10-CM

## 2024-03-15 RX ORDER — LABETALOL 200 MG/1
200 TABLET, FILM COATED ORAL 2 TIMES DAILY
Qty: 180 TABLET | Refills: 3 | Status: SHIPPED | OUTPATIENT
Start: 2024-03-15

## 2024-04-23 DIAGNOSIS — I10 PRIMARY HYPERTENSION: ICD-10-CM

## 2024-04-25 RX ORDER — LISINOPRIL 40 MG/1
40 TABLET ORAL DAILY
Qty: 90 TABLET | Refills: 3 | Status: SHIPPED | OUTPATIENT
Start: 2024-04-25

## 2024-06-02 DIAGNOSIS — I10 PRIMARY HYPERTENSION: ICD-10-CM

## 2024-06-05 RX ORDER — SPIRONOLACTONE 50 MG/1
50 TABLET, FILM COATED ORAL DAILY
Qty: 90 TABLET | Refills: 0 | Status: SHIPPED | OUTPATIENT
Start: 2024-06-05 | End: 2024-09-23

## 2024-06-30 ENCOUNTER — HEALTH MAINTENANCE LETTER (OUTPATIENT)
Age: 54
End: 2024-06-30

## 2024-09-22 DIAGNOSIS — I10 PRIMARY HYPERTENSION: ICD-10-CM

## 2024-09-23 RX ORDER — SPIRONOLACTONE 50 MG/1
50 TABLET, FILM COATED ORAL DAILY
Qty: 90 TABLET | Refills: 0 | Status: SHIPPED | OUTPATIENT
Start: 2024-09-23

## 2025-04-22 DIAGNOSIS — M10.9 GOUT, UNSPECIFIED CAUSE, UNSPECIFIED CHRONICITY, UNSPECIFIED SITE: ICD-10-CM

## 2025-04-22 RX ORDER — ALLOPURINOL 300 MG/1
1 TABLET ORAL DAILY
Qty: 90 TABLET | Refills: 3 | OUTPATIENT
Start: 2025-04-22

## 2025-04-22 NOTE — LETTER
April 24, 2025    Rafiq Domínguez  [address] [address]    Dear Rafiq,       We recently received a refill request for allopurinol (ZYLOPRIM) 300 MG tablet .  We were unable to refill this because you are due for a:    Medication check office visit-you have not been seen in the clinic for over 2 years.      Please call at your earliest convenience so that there will not be a delay with your future refills.          Thank you,   Your [facility] Team/  [phone]         Electronically signed

## 2025-04-22 NOTE — TELEPHONE ENCOUNTER
Contacts       Contact Date/Time Type Contact Phone/Fax    04/22/2025 03:15 AM CDT Interface (Incoming) [id number] [address] [facility] [address] (Pharmacy) [phone]    04/22/2025 10:10 AM CDT Phone (Outgoing) Rafiq Domínguez (Self) [phone] (M)    Left Message           Attempted to reach patient to: Schedule an appointment    When patient returns call, please take this action: Assist with scheduling    Reason for the visit:  Medication check    When to schedule: Next available    Additional comments/info: Needs to schedule an appointment t get refills, it has been over 1 year since patient has been seen.    If unable to schedule: Add a note to the telephone encounter noting the barrier and route back to primary care team lou MORRIS [facility]

## 2025-04-23 ENCOUNTER — MYC MEDICAL ADVICE (OUTPATIENT)
Dept: FAMILY MEDICINE | Facility: CLINIC | Age: 55
End: 2025-04-23
Payer: COMMERCIAL

## 2025-04-23 NOTE — TELEPHONE ENCOUNTER
No return call, sent [employer] message.Sayda ROSAS [facility]

## 2025-04-24 NOTE — TELEPHONE ENCOUNTER
No return call, no response to [employer] message. Mailed letter.Sayda MORRIS [facility]

## 2025-06-03 DIAGNOSIS — I48.19 PERSISTENT ATRIAL FIBRILLATION (H): ICD-10-CM

## 2025-06-03 RX ORDER — LABETALOL 200 MG/1
200 TABLET, FILM COATED ORAL 2 TIMES DAILY
Qty: 180 TABLET | Refills: 0 | OUTPATIENT
Start: 2025-06-03

## 2025-06-30 ENCOUNTER — MYC REFILL (OUTPATIENT)
Dept: FAMILY MEDICINE | Facility: CLINIC | Age: 55
End: 2025-06-30
Payer: COMMERCIAL

## 2025-06-30 DIAGNOSIS — I48.19 PERSISTENT ATRIAL FIBRILLATION (H): ICD-10-CM

## 2025-07-02 RX ORDER — LABETALOL 200 MG/1
200 TABLET, FILM COATED ORAL 2 TIMES DAILY
Qty: 180 TABLET | Refills: 3 | Status: SHIPPED | OUTPATIENT
Start: 2025-07-02

## 2025-07-13 ENCOUNTER — HEALTH MAINTENANCE LETTER (OUTPATIENT)
Age: 55
End: 2025-07-13

## 2025-08-06 DIAGNOSIS — I10 PRIMARY HYPERTENSION: ICD-10-CM

## 2025-08-13 RX ORDER — SPIRONOLACTONE 50 MG/1
50 TABLET, FILM COATED ORAL DAILY
Qty: 90 TABLET | Refills: 1 | Status: SHIPPED | OUTPATIENT
Start: 2025-08-13